# Patient Record
Sex: FEMALE | Race: OTHER | HISPANIC OR LATINO | URBAN - METROPOLITAN AREA
[De-identification: names, ages, dates, MRNs, and addresses within clinical notes are randomized per-mention and may not be internally consistent; named-entity substitution may affect disease eponyms.]

---

## 2015-11-03 RX ORDER — IBUPROFEN 200 MG
1 TABLET ORAL
Qty: 60 | Refills: 0 | COMMUNITY
Start: 2015-11-03 | End: 2015-12-03

## 2017-09-09 ENCOUNTER — EMERGENCY (EMERGENCY)
Facility: HOSPITAL | Age: 57
LOS: 1 days | Discharge: HOME | End: 2017-09-09
Attending: EMERGENCY MEDICINE | Admitting: EMERGENCY MEDICINE
Payer: MEDICAID

## 2017-09-09 VITALS
RESPIRATION RATE: 20 BRPM | SYSTOLIC BLOOD PRESSURE: 92 MMHG | DIASTOLIC BLOOD PRESSURE: 50 MMHG | OXYGEN SATURATION: 98 % | TEMPERATURE: 98 F | HEART RATE: 56 BPM

## 2017-09-09 VITALS
HEIGHT: 63 IN | RESPIRATION RATE: 20 BRPM | WEIGHT: 153 LBS | SYSTOLIC BLOOD PRESSURE: 129 MMHG | HEART RATE: 82 BPM | DIASTOLIC BLOOD PRESSURE: 77 MMHG | TEMPERATURE: 98 F | OXYGEN SATURATION: 98 %

## 2017-09-09 DIAGNOSIS — Z98.89 OTHER SPECIFIED POSTPROCEDURAL STATES: Chronic | ICD-10-CM

## 2017-09-09 DIAGNOSIS — Z87.828 PERSONAL HISTORY OF OTHER (HEALED) PHYSICAL INJURY AND TRAUMA: Chronic | ICD-10-CM

## 2017-09-09 LAB
ALBUMIN SERPL ELPH-MCNC: 4.3 G/DL — SIGNIFICANT CHANGE UP (ref 3.3–5.2)
ALP SERPL-CCNC: 67 U/L — SIGNIFICANT CHANGE UP (ref 40–120)
ALT FLD-CCNC: 8 U/L — SIGNIFICANT CHANGE UP
ANION GAP SERPL CALC-SCNC: 12 MMOL/L — SIGNIFICANT CHANGE UP (ref 5–17)
APPEARANCE UR: ABNORMAL
AST SERPL-CCNC: 9 U/L — SIGNIFICANT CHANGE UP
BASOPHILS # BLD AUTO: 0 K/UL — SIGNIFICANT CHANGE UP (ref 0–0.2)
BASOPHILS NFR BLD AUTO: 0.7 % — SIGNIFICANT CHANGE UP (ref 0–2)
BILIRUB SERPL-MCNC: 0.1 MG/DL — LOW (ref 0.4–2)
BILIRUB UR-MCNC: NEGATIVE — SIGNIFICANT CHANGE UP
BUN SERPL-MCNC: 13 MG/DL — SIGNIFICANT CHANGE UP (ref 8–20)
CALCIUM SERPL-MCNC: 9.5 MG/DL — SIGNIFICANT CHANGE UP (ref 8.6–10.2)
CHLORIDE SERPL-SCNC: 104 MMOL/L — SIGNIFICANT CHANGE UP (ref 98–107)
CO2 SERPL-SCNC: 28 MMOL/L — SIGNIFICANT CHANGE UP (ref 22–29)
COLOR SPEC: YELLOW — SIGNIFICANT CHANGE UP
CREAT SERPL-MCNC: 0.72 MG/DL — SIGNIFICANT CHANGE UP (ref 0.5–1.3)
DIFF PNL FLD: NEGATIVE — SIGNIFICANT CHANGE UP
EOSINOPHIL # BLD AUTO: 0.1 K/UL — SIGNIFICANT CHANGE UP (ref 0–0.5)
EOSINOPHIL NFR BLD AUTO: 1.7 % — SIGNIFICANT CHANGE UP (ref 0–6)
GLUCOSE SERPL-MCNC: 187 MG/DL — HIGH (ref 70–115)
GLUCOSE UR QL: 1000 MG/DL
HCT VFR BLD CALC: 38.4 % — SIGNIFICANT CHANGE UP (ref 37–47)
HGB BLD-MCNC: 12.6 G/DL — SIGNIFICANT CHANGE UP (ref 12–16)
KETONES UR-MCNC: NEGATIVE — SIGNIFICANT CHANGE UP
LEUKOCYTE ESTERASE UR-ACNC: NEGATIVE — SIGNIFICANT CHANGE UP
LYMPHOCYTES # BLD AUTO: 3 K/UL — SIGNIFICANT CHANGE UP (ref 1–4.8)
LYMPHOCYTES # BLD AUTO: 43.4 % — SIGNIFICANT CHANGE UP (ref 20–55)
MCHC RBC-ENTMCNC: 29.6 PG — SIGNIFICANT CHANGE UP (ref 27–31)
MCHC RBC-ENTMCNC: 32.8 G/DL — SIGNIFICANT CHANGE UP (ref 32–36)
MCV RBC AUTO: 90.4 FL — SIGNIFICANT CHANGE UP (ref 81–99)
MONOCYTES # BLD AUTO: 0.4 K/UL — SIGNIFICANT CHANGE UP (ref 0–0.8)
MONOCYTES NFR BLD AUTO: 5.7 % — SIGNIFICANT CHANGE UP (ref 3–10)
NEUTROPHILS # BLD AUTO: 3.3 K/UL — SIGNIFICANT CHANGE UP (ref 1.8–8)
NEUTROPHILS NFR BLD AUTO: 48.4 % — SIGNIFICANT CHANGE UP (ref 37–73)
NITRITE UR-MCNC: NEGATIVE — SIGNIFICANT CHANGE UP
PH UR: 6.5 — SIGNIFICANT CHANGE UP (ref 5–8)
PLATELET # BLD AUTO: 248 K/UL — SIGNIFICANT CHANGE UP (ref 150–400)
POTASSIUM SERPL-MCNC: 4.1 MMOL/L — SIGNIFICANT CHANGE UP (ref 3.5–5.3)
POTASSIUM SERPL-SCNC: 4.1 MMOL/L — SIGNIFICANT CHANGE UP (ref 3.5–5.3)
PROT SERPL-MCNC: 7.1 G/DL — SIGNIFICANT CHANGE UP (ref 6.6–8.7)
PROT UR-MCNC: NEGATIVE MG/DL — SIGNIFICANT CHANGE UP
RBC # BLD: 4.25 M/UL — LOW (ref 4.4–5.2)
RBC # FLD: 13.1 % — SIGNIFICANT CHANGE UP (ref 11–15.6)
SODIUM SERPL-SCNC: 144 MMOL/L — SIGNIFICANT CHANGE UP (ref 135–145)
SP GR SPEC: 1.01 — SIGNIFICANT CHANGE UP (ref 1.01–1.02)
UROBILINOGEN FLD QL: NEGATIVE MG/DL — SIGNIFICANT CHANGE UP
WBC # BLD: 6.9 K/UL — SIGNIFICANT CHANGE UP (ref 4.8–10.8)
WBC # FLD AUTO: 6.9 K/UL — SIGNIFICANT CHANGE UP (ref 4.8–10.8)

## 2017-09-09 PROCEDURE — 99284 EMERGENCY DEPT VISIT MOD MDM: CPT

## 2017-09-09 PROCEDURE — 74176 CT ABD & PELVIS W/O CONTRAST: CPT | Mod: 26

## 2017-09-09 RX ORDER — ONDANSETRON 8 MG/1
4 TABLET, FILM COATED ORAL ONCE
Qty: 0 | Refills: 0 | Status: COMPLETED | OUTPATIENT
Start: 2017-09-09 | End: 2017-09-09

## 2017-09-09 RX ORDER — KETOROLAC TROMETHAMINE 30 MG/ML
15 SYRINGE (ML) INJECTION ONCE
Qty: 0 | Refills: 0 | Status: DISCONTINUED | OUTPATIENT
Start: 2017-09-09 | End: 2017-09-09

## 2017-09-09 RX ORDER — MORPHINE SULFATE 50 MG/1
4 CAPSULE, EXTENDED RELEASE ORAL ONCE
Qty: 0 | Refills: 0 | Status: DISCONTINUED | OUTPATIENT
Start: 2017-09-09 | End: 2017-09-09

## 2017-09-09 RX ADMIN — Medication 15 MILLIGRAM(S): at 23:47

## 2017-09-09 RX ADMIN — Medication 15 MILLIGRAM(S): at 22:17

## 2017-09-09 RX ADMIN — ONDANSETRON 4 MILLIGRAM(S): 8 TABLET, FILM COATED ORAL at 22:17

## 2017-09-09 NOTE — ED STATDOCS - ATTENDING CONTRIBUTION TO CARE
I, Dr. Grijalva, performed the initial face to face bedside interview with this patient regarding history of present illness, review of symptoms and relevant past medical, social and family history.  I completed an independent physical examination.  I was the initial provider who evaluated this patient. I have signed out the follow up of any pending tests (i.e. labs, radiological studies) to the ACP.  I have communicated the patient’s plan of care and disposition with the ACP.

## 2017-09-09 NOTE — ED STATDOCS - OBJECTIVE STATEMENT
57 year old female with PMHx hysterectomy, pyonephritis, IDDM presents to ED c/o of a stabbing worsening pain on her left flanking pain starting x4 days ago. Pain is also described as a burning sensation. 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 Pt states these symptoms resemble her episode of shingles. She took x2 Advil doses with no relief. Pt claims vomiting, dysuria, chills, nausea but denies fevers. No further complaints at this time.

## 2017-09-09 NOTE — ED STATDOCS - PMH
Anemia    Anxiety    Bipolar disorder    Depression    Diabetes    High cholesterol    Hypertension    Shingles

## 2017-09-09 NOTE — ED STATDOCS - PROGRESS NOTE DETAILS
Received patient signout from Dr. Grijalva.  Patient with right flank pain pending labs and CT. PA NOTE: Pt discussed at length with attending HPI/ROS/PE confirmed. PT seen resting computably in no acute distress in chair, no acute complaints at this time, PT informed of all results, PT will be DC home with ABX, pain medication, follow up with spinal center. Educated about when to return to the ED if needed. PT verbalizes that he understands all instructions and results.

## 2017-09-09 NOTE — ED STATDOCS - MEDICAL DECISION MAKING DETAILS
Plan on obtaining CT scan, labs. Pain and nausea management. Will evaluate for possible kidney stone and UTI.

## 2017-09-10 LAB
BACTERIA # UR AUTO: ABNORMAL
EPI CELLS # UR: ABNORMAL
WBC UR QL: SIGNIFICANT CHANGE UP

## 2017-09-10 PROCEDURE — 80053 COMPREHEN METABOLIC PANEL: CPT

## 2017-09-10 PROCEDURE — 99284 EMERGENCY DEPT VISIT MOD MDM: CPT | Mod: 25

## 2017-09-10 PROCEDURE — 87086 URINE CULTURE/COLONY COUNT: CPT

## 2017-09-10 PROCEDURE — 85027 COMPLETE CBC AUTOMATED: CPT

## 2017-09-10 PROCEDURE — 81001 URINALYSIS AUTO W/SCOPE: CPT

## 2017-09-10 PROCEDURE — 96375 TX/PRO/DX INJ NEW DRUG ADDON: CPT

## 2017-09-10 PROCEDURE — 96374 THER/PROPH/DIAG INJ IV PUSH: CPT

## 2017-09-10 PROCEDURE — 36415 COLL VENOUS BLD VENIPUNCTURE: CPT

## 2017-09-10 PROCEDURE — 74176 CT ABD & PELVIS W/O CONTRAST: CPT

## 2017-09-10 PROCEDURE — 96372 THER/PROPH/DIAG INJ SC/IM: CPT | Mod: XU

## 2017-09-10 RX ORDER — DEXAMETHASONE 0.5 MG/5ML
8 ELIXIR ORAL ONCE
Qty: 0 | Refills: 0 | Status: COMPLETED | OUTPATIENT
Start: 2017-09-10 | End: 2017-09-10

## 2017-09-10 RX ORDER — NITROFURANTOIN MACROCRYSTAL 50 MG
1 CAPSULE ORAL
Qty: 14 | Refills: 0 | OUTPATIENT
Start: 2017-09-10 | End: 2017-09-17

## 2017-09-10 RX ORDER — IBUPROFEN 200 MG
1 TABLET ORAL
Qty: 12 | Refills: 0 | OUTPATIENT
Start: 2017-09-10 | End: 2017-09-14

## 2017-09-10 RX ORDER — METHOCARBAMOL 500 MG/1
1 TABLET, FILM COATED ORAL
Qty: 8 | Refills: 0 | OUTPATIENT
Start: 2017-09-10 | End: 2017-09-14

## 2017-09-10 RX ADMIN — MORPHINE SULFATE 4 MILLIGRAM(S): 50 CAPSULE, EXTENDED RELEASE ORAL at 00:07

## 2017-09-10 RX ADMIN — Medication 8 MILLIGRAM(S): at 02:29

## 2017-09-10 RX ADMIN — MORPHINE SULFATE 4 MILLIGRAM(S): 50 CAPSULE, EXTENDED RELEASE ORAL at 02:29

## 2017-09-10 NOTE — ED ADULT NURSE REASSESSMENT NOTE - STATUS
assumed care of pt, pt c/o increased pain, PA made aware, morphine ordered and given. 20g LAC present, pt pending CT results.

## 2017-09-11 LAB
CULTURE RESULTS: SIGNIFICANT CHANGE UP
SPECIMEN SOURCE: SIGNIFICANT CHANGE UP

## 2017-11-14 ENCOUNTER — EMERGENCY (EMERGENCY)
Facility: HOSPITAL | Age: 57
LOS: 1 days | Discharge: HOME | End: 2017-11-14
Attending: EMERGENCY MEDICINE | Admitting: EMERGENCY MEDICINE
Payer: MEDICAID

## 2017-11-14 VITALS
DIASTOLIC BLOOD PRESSURE: 60 MMHG | OXYGEN SATURATION: 98 % | WEIGHT: 179.9 LBS | HEIGHT: 65 IN | RESPIRATION RATE: 20 BRPM | SYSTOLIC BLOOD PRESSURE: 90 MMHG | HEART RATE: 66 BPM | TEMPERATURE: 98 F

## 2017-11-14 VITALS
HEART RATE: 71 BPM | TEMPERATURE: 99 F | RESPIRATION RATE: 16 BRPM | SYSTOLIC BLOOD PRESSURE: 123 MMHG | OXYGEN SATURATION: 99 % | DIASTOLIC BLOOD PRESSURE: 84 MMHG

## 2017-11-14 DIAGNOSIS — Z98.89 OTHER SPECIFIED POSTPROCEDURAL STATES: Chronic | ICD-10-CM

## 2017-11-14 DIAGNOSIS — Z87.828 PERSONAL HISTORY OF OTHER (HEALED) PHYSICAL INJURY AND TRAUMA: Chronic | ICD-10-CM

## 2017-11-14 LAB
ALBUMIN SERPL ELPH-MCNC: 4.5 G/DL — SIGNIFICANT CHANGE UP (ref 3.3–5.2)
ALP SERPL-CCNC: 50 U/L — SIGNIFICANT CHANGE UP (ref 40–120)
ALT FLD-CCNC: 9 U/L — SIGNIFICANT CHANGE UP
ANION GAP SERPL CALC-SCNC: 12 MMOL/L — SIGNIFICANT CHANGE UP (ref 5–17)
APPEARANCE UR: CLEAR — SIGNIFICANT CHANGE UP
AST SERPL-CCNC: 12 U/L — SIGNIFICANT CHANGE UP
BILIRUB SERPL-MCNC: 0.2 MG/DL — LOW (ref 0.4–2)
BILIRUB UR-MCNC: NEGATIVE — SIGNIFICANT CHANGE UP
BUN SERPL-MCNC: 12 MG/DL — SIGNIFICANT CHANGE UP (ref 8–20)
CALCIUM SERPL-MCNC: 9.8 MG/DL — SIGNIFICANT CHANGE UP (ref 8.6–10.2)
CHLORIDE SERPL-SCNC: 101 MMOL/L — SIGNIFICANT CHANGE UP (ref 98–107)
CO2 SERPL-SCNC: 28 MMOL/L — SIGNIFICANT CHANGE UP (ref 22–29)
COLOR SPEC: SIGNIFICANT CHANGE UP
CREAT SERPL-MCNC: 0.98 MG/DL — SIGNIFICANT CHANGE UP (ref 0.5–1.3)
DIFF PNL FLD: NEGATIVE — SIGNIFICANT CHANGE UP
GLUCOSE SERPL-MCNC: 136 MG/DL — HIGH (ref 70–115)
GLUCOSE UR QL: NEGATIVE MG/DL — SIGNIFICANT CHANGE UP
HCT VFR BLD CALC: 39.4 % — SIGNIFICANT CHANGE UP (ref 37–47)
HGB BLD-MCNC: 13.4 G/DL — SIGNIFICANT CHANGE UP (ref 12–16)
KETONES UR-MCNC: NEGATIVE — SIGNIFICANT CHANGE UP
LEUKOCYTE ESTERASE UR-ACNC: NEGATIVE — SIGNIFICANT CHANGE UP
MCHC RBC-ENTMCNC: 29.5 PG — SIGNIFICANT CHANGE UP (ref 27–31)
MCHC RBC-ENTMCNC: 34 G/DL — SIGNIFICANT CHANGE UP (ref 32–36)
MCV RBC AUTO: 86.8 FL — SIGNIFICANT CHANGE UP (ref 81–99)
NITRITE UR-MCNC: NEGATIVE — SIGNIFICANT CHANGE UP
PH UR: 6.5 — SIGNIFICANT CHANGE UP (ref 5–8)
PLATELET # BLD AUTO: 249 K/UL — SIGNIFICANT CHANGE UP (ref 150–400)
POTASSIUM SERPL-MCNC: 4.5 MMOL/L — SIGNIFICANT CHANGE UP (ref 3.5–5.3)
POTASSIUM SERPL-SCNC: 4.5 MMOL/L — SIGNIFICANT CHANGE UP (ref 3.5–5.3)
PROT SERPL-MCNC: 7.3 G/DL — SIGNIFICANT CHANGE UP (ref 6.6–8.7)
PROT UR-MCNC: NEGATIVE MG/DL — SIGNIFICANT CHANGE UP
RBC # BLD: 4.54 M/UL — SIGNIFICANT CHANGE UP (ref 4.4–5.2)
RBC # FLD: 12.6 % — SIGNIFICANT CHANGE UP (ref 11–15.6)
SODIUM SERPL-SCNC: 141 MMOL/L — SIGNIFICANT CHANGE UP (ref 135–145)
SP GR SPEC: 1 — LOW (ref 1.01–1.02)
UROBILINOGEN FLD QL: NEGATIVE MG/DL — SIGNIFICANT CHANGE UP
WBC # BLD: 7.6 K/UL — SIGNIFICANT CHANGE UP (ref 4.8–10.8)
WBC # FLD AUTO: 7.6 K/UL — SIGNIFICANT CHANGE UP (ref 4.8–10.8)

## 2017-11-14 PROCEDURE — 99284 EMERGENCY DEPT VISIT MOD MDM: CPT | Mod: 25

## 2017-11-14 PROCEDURE — 76770 US EXAM ABDO BACK WALL COMP: CPT

## 2017-11-14 PROCEDURE — 96375 TX/PRO/DX INJ NEW DRUG ADDON: CPT

## 2017-11-14 PROCEDURE — 96374 THER/PROPH/DIAG INJ IV PUSH: CPT

## 2017-11-14 PROCEDURE — 81003 URINALYSIS AUTO W/O SCOPE: CPT

## 2017-11-14 PROCEDURE — 76775 US EXAM ABDO BACK WALL LIM: CPT | Mod: 26

## 2017-11-14 PROCEDURE — 99285 EMERGENCY DEPT VISIT HI MDM: CPT

## 2017-11-14 PROCEDURE — 76770 US EXAM ABDO BACK WALL COMP: CPT | Mod: 26

## 2017-11-14 PROCEDURE — 87086 URINE CULTURE/COLONY COUNT: CPT

## 2017-11-14 PROCEDURE — 80053 COMPREHEN METABOLIC PANEL: CPT

## 2017-11-14 PROCEDURE — 76775 US EXAM ABDO BACK WALL LIM: CPT

## 2017-11-14 PROCEDURE — 36415 COLL VENOUS BLD VENIPUNCTURE: CPT

## 2017-11-14 PROCEDURE — 85027 COMPLETE CBC AUTOMATED: CPT

## 2017-11-14 RX ORDER — SODIUM CHLORIDE 9 MG/ML
1000 INJECTION INTRAMUSCULAR; INTRAVENOUS; SUBCUTANEOUS ONCE
Qty: 0 | Refills: 0 | Status: COMPLETED | OUTPATIENT
Start: 2017-11-14 | End: 2017-11-14

## 2017-11-14 RX ORDER — OXYCODONE AND ACETAMINOPHEN 5; 325 MG/1; MG/1
1 TABLET ORAL ONCE
Qty: 0 | Refills: 0 | Status: DISCONTINUED | OUTPATIENT
Start: 2017-11-14 | End: 2017-11-14

## 2017-11-14 RX ORDER — MORPHINE SULFATE 50 MG/1
2 CAPSULE, EXTENDED RELEASE ORAL ONCE
Qty: 0 | Refills: 0 | Status: DISCONTINUED | OUTPATIENT
Start: 2017-11-14 | End: 2017-11-14

## 2017-11-14 RX ORDER — KETOROLAC TROMETHAMINE 30 MG/ML
30 SYRINGE (ML) INJECTION ONCE
Qty: 0 | Refills: 0 | Status: DISCONTINUED | OUTPATIENT
Start: 2017-11-14 | End: 2017-11-14

## 2017-11-14 RX ADMIN — SODIUM CHLORIDE 2000 MILLILITER(S): 9 INJECTION INTRAMUSCULAR; INTRAVENOUS; SUBCUTANEOUS at 17:40

## 2017-11-14 RX ADMIN — OXYCODONE AND ACETAMINOPHEN 1 TABLET(S): 5; 325 TABLET ORAL at 19:47

## 2017-11-14 RX ADMIN — MORPHINE SULFATE 2 MILLIGRAM(S): 50 CAPSULE, EXTENDED RELEASE ORAL at 17:39

## 2017-11-14 RX ADMIN — Medication 30 MILLIGRAM(S): at 17:39

## 2017-11-14 NOTE — ED STATDOCS - ATTENDING CONTRIBUTION TO CARE
I, Jonathon Yen, performed the initial face to face bedside interview with this patient regarding history of present illness, review of symptoms and relevant past medical, social and family history.  I completed an independent physical examination.  I was the initial provider who evaluated this patient. I have signed out the follow up of any pending tests (i.e. labs, radiological studies) to the ACP.  I have communicated the patient’s plan of care and disposition with the ACP.

## 2017-11-14 NOTE — ED ADULT NURSE NOTE - OBJECTIVE STATEMENT
pt presents to ED with left flank pain since yesterday with dysuria. denies hematuria. afebrile. denies n/v pt states she has hx of sciatica. pt ambulates with pain. a&ox3. will continue to monitor and reassess

## 2017-11-14 NOTE — ED STATDOCS - OBJECTIVE STATEMENT
56 yo F presents to ED c/o right sided back pain radiating down to RLE similar to sciatica pain. Pt reports difficulty walking secondary to pain. She also reports new onset of left sided abd pain radiating to left flank. Pt has had multiple evaluations including imaging for similar pain. Minimal relief with Motrin.   Associated hot flashes and headaches. Denies fevers, dysuria. PMHx includes diabetes, asthma, anxiety. PSHx includes partial hysterectomy. Denies tobacco and alcohol.
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
## 2017-11-14 NOTE — ED STATDOCS - CARE PLAN
Principal Discharge DX:	Sciatic leg pain  Instructions for follow-up, activity and diet:	Continue with OTC pain medication as discussed Principal Discharge DX:	Sciatic leg pain  Instructions for follow-up, activity and diet:	Continue with OTC pain medication as discussed  Secondary Diagnosis:	Flank pain, acute

## 2017-11-15 LAB
CULTURE RESULTS: SIGNIFICANT CHANGE UP
SPECIMEN SOURCE: SIGNIFICANT CHANGE UP

## 2017-11-24 ENCOUNTER — EMERGENCY (EMERGENCY)
Facility: HOSPITAL | Age: 57
LOS: 1 days | Discharge: HOME | End: 2017-11-24
Attending: EMERGENCY MEDICINE
Payer: MEDICAID

## 2017-11-24 VITALS
HEIGHT: 63 IN | HEART RATE: 83 BPM | WEIGHT: 151.02 LBS | RESPIRATION RATE: 20 BRPM | OXYGEN SATURATION: 99 % | TEMPERATURE: 98 F | SYSTOLIC BLOOD PRESSURE: 119 MMHG | DIASTOLIC BLOOD PRESSURE: 85 MMHG

## 2017-11-24 DIAGNOSIS — Z98.89 OTHER SPECIFIED POSTPROCEDURAL STATES: Chronic | ICD-10-CM

## 2017-11-24 DIAGNOSIS — Z87.828 PERSONAL HISTORY OF OTHER (HEALED) PHYSICAL INJURY AND TRAUMA: Chronic | ICD-10-CM

## 2017-11-24 PROCEDURE — 96372 THER/PROPH/DIAG INJ SC/IM: CPT

## 2017-11-24 PROCEDURE — 99283 EMERGENCY DEPT VISIT LOW MDM: CPT | Mod: 25

## 2017-11-24 PROCEDURE — 99284 EMERGENCY DEPT VISIT MOD MDM: CPT

## 2017-11-24 RX ORDER — DEXAMETHASONE 0.5 MG/5ML
10 ELIXIR ORAL ONCE
Qty: 0 | Refills: 0 | Status: COMPLETED | OUTPATIENT
Start: 2017-11-24 | End: 2017-11-24

## 2017-11-24 RX ORDER — KETOROLAC TROMETHAMINE 30 MG/ML
30 SYRINGE (ML) INJECTION ONCE
Qty: 0 | Refills: 0 | Status: DISCONTINUED | OUTPATIENT
Start: 2017-11-24 | End: 2017-11-24

## 2017-11-24 RX ORDER — ACETAMINOPHEN 500 MG
650 TABLET ORAL ONCE
Qty: 0 | Refills: 0 | Status: COMPLETED | OUTPATIENT
Start: 2017-11-24 | End: 2017-11-24

## 2017-11-24 RX ORDER — METHOCARBAMOL 500 MG/1
1 TABLET, FILM COATED ORAL
Qty: 14 | Refills: 0 | OUTPATIENT
Start: 2017-11-24 | End: 2017-12-01

## 2017-11-24 RX ORDER — LIDOCAINE 4 G/100G
1 CREAM TOPICAL ONCE
Qty: 0 | Refills: 0 | Status: COMPLETED | OUTPATIENT
Start: 2017-11-24 | End: 2017-11-24

## 2017-11-24 RX ORDER — DIAZEPAM 5 MG
2 TABLET ORAL ONCE
Qty: 0 | Refills: 0 | Status: DISCONTINUED | OUTPATIENT
Start: 2017-11-24 | End: 2017-11-24

## 2017-11-24 RX ADMIN — Medication 10 MILLIGRAM(S): at 14:25

## 2017-11-24 RX ADMIN — Medication 650 MILLIGRAM(S): at 14:26

## 2017-11-24 RX ADMIN — Medication 650 MILLIGRAM(S): at 14:25

## 2017-11-24 RX ADMIN — Medication 2 MILLIGRAM(S): at 12:37

## 2017-11-24 RX ADMIN — LIDOCAINE 1 PATCH: 4 CREAM TOPICAL at 14:36

## 2017-11-24 RX ADMIN — Medication 30 MILLIGRAM(S): at 12:37

## 2017-11-24 RX ADMIN — Medication 30 MILLIGRAM(S): at 12:39

## 2017-11-24 NOTE — ED STATDOCS - MUSCULOSKELETAL, MLM
R medial scapular muscles spasms extending up to R cervical.  L medial scapular muscle spasms. Decreased bilat side bend. Limited rotation to the R.

## 2017-11-24 NOTE — ED STATDOCS - ATTENDING CONTRIBUTION TO CARE
I, Shikha Lizarraga, performed the initial face to face bedside interview with this patient regarding history of present illness, review of symptoms and relevant past medical, social and family history.  I completed an independent physical examination.  I was the initial provider who evaluated this patient. I have signed out the follow up of any pending tests (i.e. labs, radiological studies) to the ACP.  I have communicated the patient’s plan of care and disposition with the ACP.  The history, relevant review of systems, past medical and surgical history, medical decision making, and physical examination was documented by the scribe in my presence and I attest to the accuracy of the documentation.

## 2017-11-24 NOTE — ED STATDOCS - OBJECTIVE STATEMENT
This is a 56 y/o F presents to ED c/o stiff neck and R arm pain. Pt reports a few days ago awoke with HA, went to sleep awoke with neck pain and now pain is radiating to R arm and feels tingling to fingers. Pt tried using hot compress, hold compress and Motrin to no relief. Denies N/V/D, fever, chills, SOB, CP, difficulty breathing and abd pain.

## 2017-11-24 NOTE — ED STATDOCS - PROGRESS NOTE DETAILS
PA NOTE: Pt seen by intake physician and hpi/orders/plan reviewed. Will follow up plan as per intake physician. PT reports improving pain s/p medication. Will d/c home with spine follow up

## 2017-11-24 NOTE — ED ADULT NURSE NOTE - OBJECTIVE STATEMENT
pt arrived with right arm and neck pain, pt states she woke up with severe pain 9/10 and stiffness radiating down arm with tingling, pt states pain has gotten worse, denies any trauma

## 2018-01-26 ENCOUNTER — EMERGENCY (EMERGENCY)
Facility: HOSPITAL | Age: 58
LOS: 1 days | Discharge: HOME | End: 2018-01-26
Attending: EMERGENCY MEDICINE
Payer: MEDICAID

## 2018-01-26 VITALS
DIASTOLIC BLOOD PRESSURE: 65 MMHG | HEART RATE: 78 BPM | TEMPERATURE: 98 F | OXYGEN SATURATION: 97 % | RESPIRATION RATE: 20 BRPM | WEIGHT: 141.98 LBS | HEIGHT: 62 IN | SYSTOLIC BLOOD PRESSURE: 95 MMHG

## 2018-01-26 DIAGNOSIS — Z87.828 PERSONAL HISTORY OF OTHER (HEALED) PHYSICAL INJURY AND TRAUMA: Chronic | ICD-10-CM

## 2018-01-26 DIAGNOSIS — Z98.89 OTHER SPECIFIED POSTPROCEDURAL STATES: Chronic | ICD-10-CM

## 2018-01-26 PROCEDURE — 99283 EMERGENCY DEPT VISIT LOW MDM: CPT

## 2018-01-26 RX ORDER — IBUPROFEN 200 MG
600 TABLET ORAL ONCE
Qty: 0 | Refills: 0 | Status: DISCONTINUED | OUTPATIENT
Start: 2018-01-26 | End: 2018-01-26

## 2018-01-26 RX ORDER — AZTREONAM 2 G
1 VIAL (EA) INJECTION
Qty: 20 | Refills: 0 | OUTPATIENT
Start: 2018-01-26 | End: 2018-02-04

## 2018-01-26 RX ORDER — ACETAMINOPHEN 500 MG
975 TABLET ORAL ONCE
Qty: 0 | Refills: 0 | Status: COMPLETED | OUTPATIENT
Start: 2018-01-26 | End: 2018-01-26

## 2018-01-26 RX ADMIN — Medication 1 TABLET(S): at 17:51

## 2018-01-26 RX ADMIN — Medication 975 MILLIGRAM(S): at 17:57

## 2018-01-26 NOTE — ED PROVIDER NOTE - PHYSICAL EXAMINATION
Positive red circular red lesion to right breast with central punctate, no mass, no lump, no current discharge, no induration, no fluctuance

## 2018-01-26 NOTE — ED ADULT NURSE NOTE - CAS ELECT INFOMATION PROVIDED
Physical Exam  Mallampati: II  TM Distance: >3 FB  Neck ROM: Full  cardiovascular exam normal  pulmonary exam normal  abdominal exam normal      Legend: C=Chipped  M=Missing  L=Loose    Anesthesia Plan  ASA Status: 2  Anesthesia Type: MAC  Induction: Intravenous  Maintenance: TIVA  Reviewed: Problem List, Allergies, Patient Summary, Lab Results, Past Med History, Medications, NPO Status and Pre-Induction Reassessment  The proposed anesthetic plan, including its risks and benefits, have been discussed with the Patient - along with the risks and benefits of alternatives.  Questions were encouraged and answered and the patient and/or representative agrees to proceed.  Blood Products: Not Anticipated  Plan Comments: Monitored anesthesia care, risks/benefits explained to patient. Questions/concerns addressed. Consent obtained to proceed as discussed.      Anesthesia ROS/Med Hx    Overall Review:  Pts. EKG was reviewed   Pt. has no history of anesthetic complications  Pt. does not have difficult airway    Pulmonary Review:    Negative for pulmonary    Neuro/Psych Review:    Negative for all neuro/psych ROS    Cardiovascular Review:    Pt. positive for hypertension  Pt. positive for hyperlipidemia    GI/HEPATIC/RENAL Review:    Pt. negative for GI/Hepatic/Renal ROS    End/Other Review:    Pt. positive for substance abuse (Chronic alcohol)    
DC instructions

## 2018-01-26 NOTE — ED ADULT TRIAGE NOTE - NS ED NURSE BANDS TYPE
tsh still elevated. ft4 improved. If has been taking med regularly, inc to 125mcg po daily. Rpt thyroid labs in 2 mo. Reflex         Plan:                  I, Christiano Saini MA, am scribing for and in the presence of Eric Litten, MD. Electronically signed by Christiano Saini MA on 1/25/2018 at 6:55 PM    I, Eric Litten, MD  personally performed the services described in this documentation, as scribed by the user listed above in my presence, and it is both accurate and complete. I agree with the Chief Complaint, ROS, and Past Histories independently gathered by the clinical support staff and the remaining scribed note accurately describes my personal service to the patient.     1/25/2018    7:43 PM Name band;

## 2018-01-26 NOTE — ED PROVIDER NOTE - OBJECTIVE STATEMENT
Patient presents c/o abscess to right breast, patient states abscess opened up yesterday with green discharge.  no fevers, no chills, patient has had similar in past

## 2018-01-26 NOTE — ED ADULT NURSE NOTE - OBJECTIVE STATEMENT
pt arrived with abscess to right breast, pt also states started to get one to the left breast pt states very painful 9/10 pt also states yellow discharge from right side

## 2018-01-26 NOTE — ED PROVIDER NOTE - ATTENDING CONTRIBUTION TO CARE
I, Tanna Silva, performed the initial face to face bedside interview with this patient regarding history of present illness, review of symptoms and relevant past medical, social and family history.  I completed an independent physical examination.  I was the initial provider who evaluated this patient. I have signed out the follow up of any pending tests (i.e. labs, radiological studies) to the ACP.  I have communicated the patient’s plan of care and disposition with the ACP.

## 2018-04-01 ENCOUNTER — OUTPATIENT (OUTPATIENT)
Dept: OUTPATIENT SERVICES | Facility: HOSPITAL | Age: 58
LOS: 1 days | End: 2018-04-01
Payer: MEDICAID

## 2018-04-01 DIAGNOSIS — Z98.89 OTHER SPECIFIED POSTPROCEDURAL STATES: Chronic | ICD-10-CM

## 2018-04-01 DIAGNOSIS — Z87.828 PERSONAL HISTORY OF OTHER (HEALED) PHYSICAL INJURY AND TRAUMA: Chronic | ICD-10-CM

## 2018-04-01 PROCEDURE — G9001: CPT

## 2018-04-18 DIAGNOSIS — R69 ILLNESS, UNSPECIFIED: ICD-10-CM

## 2018-04-22 ENCOUNTER — EMERGENCY (EMERGENCY)
Facility: HOSPITAL | Age: 58
LOS: 1 days | Discharge: HOME | End: 2018-04-22
Attending: EMERGENCY MEDICINE
Payer: MEDICAID

## 2018-04-22 VITALS — HEIGHT: 63 IN | WEIGHT: 145.95 LBS

## 2018-04-22 VITALS
SYSTOLIC BLOOD PRESSURE: 100 MMHG | HEART RATE: 98 BPM | RESPIRATION RATE: 18 BRPM | TEMPERATURE: 98 F | OXYGEN SATURATION: 96 % | DIASTOLIC BLOOD PRESSURE: 65 MMHG

## 2018-04-22 DIAGNOSIS — Z98.89 OTHER SPECIFIED POSTPROCEDURAL STATES: Chronic | ICD-10-CM

## 2018-04-22 DIAGNOSIS — Z87.828 PERSONAL HISTORY OF OTHER (HEALED) PHYSICAL INJURY AND TRAUMA: Chronic | ICD-10-CM

## 2018-04-22 LAB
ALBUMIN SERPL ELPH-MCNC: 4.3 G/DL — SIGNIFICANT CHANGE UP (ref 3.3–5.2)
ALP SERPL-CCNC: 52 U/L — SIGNIFICANT CHANGE UP (ref 40–120)
ALT FLD-CCNC: 12 U/L — SIGNIFICANT CHANGE UP
ANION GAP SERPL CALC-SCNC: 13 MMOL/L — SIGNIFICANT CHANGE UP (ref 5–17)
APPEARANCE UR: CLEAR — SIGNIFICANT CHANGE UP
APTT BLD: 30.1 SEC — SIGNIFICANT CHANGE UP (ref 27.5–37.4)
AST SERPL-CCNC: 13 U/L — SIGNIFICANT CHANGE UP
BASOPHILS # BLD AUTO: 0 K/UL — SIGNIFICANT CHANGE UP (ref 0–0.2)
BASOPHILS NFR BLD AUTO: 0.2 % — SIGNIFICANT CHANGE UP (ref 0–2)
BILIRUB SERPL-MCNC: <0.2 MG/DL — LOW (ref 0.4–2)
BILIRUB UR-MCNC: NEGATIVE — SIGNIFICANT CHANGE UP
BLD GP AB SCN SERPL QL: SIGNIFICANT CHANGE UP
BUN SERPL-MCNC: 17 MG/DL — SIGNIFICANT CHANGE UP (ref 8–20)
CALCIUM SERPL-MCNC: 9.2 MG/DL — SIGNIFICANT CHANGE UP (ref 8.6–10.2)
CHLORIDE SERPL-SCNC: 99 MMOL/L — SIGNIFICANT CHANGE UP (ref 98–107)
CO2 SERPL-SCNC: 25 MMOL/L — SIGNIFICANT CHANGE UP (ref 22–29)
COLOR SPEC: YELLOW — SIGNIFICANT CHANGE UP
CREAT SERPL-MCNC: 0.7 MG/DL — SIGNIFICANT CHANGE UP (ref 0.5–1.3)
DIFF PNL FLD: ABNORMAL
EOSINOPHIL # BLD AUTO: 0.2 K/UL — SIGNIFICANT CHANGE UP (ref 0–0.5)
EOSINOPHIL NFR BLD AUTO: 1.7 % — SIGNIFICANT CHANGE UP (ref 0–6)
EPI CELLS # UR: SIGNIFICANT CHANGE UP
GLUCOSE SERPL-MCNC: 403 MG/DL — HIGH (ref 70–115)
GLUCOSE UR QL: 1000 MG/DL
HCT VFR BLD CALC: 38.4 % — SIGNIFICANT CHANGE UP (ref 37–47)
HGB BLD-MCNC: 12.8 G/DL — SIGNIFICANT CHANGE UP (ref 12–16)
INR BLD: 0.92 RATIO — SIGNIFICANT CHANGE UP (ref 0.88–1.16)
KETONES UR-MCNC: NEGATIVE — SIGNIFICANT CHANGE UP
LEUKOCYTE ESTERASE UR-ACNC: ABNORMAL
LIDOCAIN IGE QN: 39 U/L — SIGNIFICANT CHANGE UP (ref 22–51)
LYMPHOCYTES # BLD AUTO: 1.5 K/UL — SIGNIFICANT CHANGE UP (ref 1–4.8)
LYMPHOCYTES # BLD AUTO: 17.4 % — LOW (ref 20–55)
MCHC RBC-ENTMCNC: 29.8 PG — SIGNIFICANT CHANGE UP (ref 27–31)
MCHC RBC-ENTMCNC: 33.3 G/DL — SIGNIFICANT CHANGE UP (ref 32–36)
MCV RBC AUTO: 89.3 FL — SIGNIFICANT CHANGE UP (ref 81–99)
MONOCYTES # BLD AUTO: 0.7 K/UL — SIGNIFICANT CHANGE UP (ref 0–0.8)
MONOCYTES NFR BLD AUTO: 8.2 % — SIGNIFICANT CHANGE UP (ref 3–10)
NEUTROPHILS # BLD AUTO: 6.3 K/UL — SIGNIFICANT CHANGE UP (ref 1.8–8)
NEUTROPHILS NFR BLD AUTO: 72.3 % — SIGNIFICANT CHANGE UP (ref 37–73)
NITRITE UR-MCNC: NEGATIVE — SIGNIFICANT CHANGE UP
PH UR: 6 — SIGNIFICANT CHANGE UP (ref 5–8)
PLATELET # BLD AUTO: 223 K/UL — SIGNIFICANT CHANGE UP (ref 150–400)
POTASSIUM SERPL-MCNC: 4.5 MMOL/L — SIGNIFICANT CHANGE UP (ref 3.5–5.3)
POTASSIUM SERPL-SCNC: 4.5 MMOL/L — SIGNIFICANT CHANGE UP (ref 3.5–5.3)
PROT SERPL-MCNC: 6.9 G/DL — SIGNIFICANT CHANGE UP (ref 6.6–8.7)
PROT UR-MCNC: 15 MG/DL
PROTHROM AB SERPL-ACNC: 10.1 SEC — SIGNIFICANT CHANGE UP (ref 9.8–12.7)
RBC # BLD: 4.3 M/UL — LOW (ref 4.4–5.2)
RBC # FLD: 13.4 % — SIGNIFICANT CHANGE UP (ref 11–15.6)
RBC CASTS # UR COMP ASSIST: SIGNIFICANT CHANGE UP /HPF (ref 0–4)
SODIUM SERPL-SCNC: 137 MMOL/L — SIGNIFICANT CHANGE UP (ref 135–145)
SP GR SPEC: 1.01 — SIGNIFICANT CHANGE UP (ref 1.01–1.02)
UROBILINOGEN FLD QL: NEGATIVE MG/DL — SIGNIFICANT CHANGE UP
WBC # BLD: 8.7 K/UL — SIGNIFICANT CHANGE UP (ref 4.8–10.8)
WBC # FLD AUTO: 8.7 K/UL — SIGNIFICANT CHANGE UP (ref 4.8–10.8)
WBC UR QL: ABNORMAL

## 2018-04-22 PROCEDURE — 86850 RBC ANTIBODY SCREEN: CPT

## 2018-04-22 PROCEDURE — 74176 CT ABD & PELVIS W/O CONTRAST: CPT | Mod: 26

## 2018-04-22 PROCEDURE — 99284 EMERGENCY DEPT VISIT MOD MDM: CPT

## 2018-04-22 PROCEDURE — 86900 BLOOD TYPING SEROLOGIC ABO: CPT

## 2018-04-22 PROCEDURE — 36415 COLL VENOUS BLD VENIPUNCTURE: CPT

## 2018-04-22 PROCEDURE — 85730 THROMBOPLASTIN TIME PARTIAL: CPT

## 2018-04-22 PROCEDURE — 85027 COMPLETE CBC AUTOMATED: CPT

## 2018-04-22 PROCEDURE — 99284 EMERGENCY DEPT VISIT MOD MDM: CPT | Mod: 25

## 2018-04-22 PROCEDURE — 87186 SC STD MICRODIL/AGAR DIL: CPT

## 2018-04-22 PROCEDURE — 74176 CT ABD & PELVIS W/O CONTRAST: CPT

## 2018-04-22 PROCEDURE — 87086 URINE CULTURE/COLONY COUNT: CPT

## 2018-04-22 PROCEDURE — 81001 URINALYSIS AUTO W/SCOPE: CPT

## 2018-04-22 PROCEDURE — 96374 THER/PROPH/DIAG INJ IV PUSH: CPT

## 2018-04-22 PROCEDURE — 86901 BLOOD TYPING SEROLOGIC RH(D): CPT

## 2018-04-22 PROCEDURE — 85610 PROTHROMBIN TIME: CPT

## 2018-04-22 PROCEDURE — 83690 ASSAY OF LIPASE: CPT

## 2018-04-22 PROCEDURE — 80053 COMPREHEN METABOLIC PANEL: CPT

## 2018-04-22 RX ORDER — SODIUM CHLORIDE 9 MG/ML
1000 INJECTION INTRAMUSCULAR; INTRAVENOUS; SUBCUTANEOUS ONCE
Qty: 0 | Refills: 0 | Status: COMPLETED | OUTPATIENT
Start: 2018-04-22 | End: 2018-04-22

## 2018-04-22 RX ORDER — KETOROLAC TROMETHAMINE 30 MG/ML
15 SYRINGE (ML) INJECTION ONCE
Qty: 0 | Refills: 0 | Status: DISCONTINUED | OUTPATIENT
Start: 2018-04-22 | End: 2018-04-22

## 2018-04-22 RX ORDER — MORPHINE SULFATE 50 MG/1
2 CAPSULE, EXTENDED RELEASE ORAL ONCE
Qty: 0 | Refills: 0 | Status: DISCONTINUED | OUTPATIENT
Start: 2018-04-22 | End: 2018-04-22

## 2018-04-22 RX ORDER — CEPHALEXIN 500 MG
1 CAPSULE ORAL
Qty: 28 | Refills: 0 | OUTPATIENT
Start: 2018-04-22 | End: 2018-04-28

## 2018-04-22 RX ADMIN — Medication 15 MILLIGRAM(S): at 21:35

## 2018-04-22 RX ADMIN — SODIUM CHLORIDE 1000 MILLILITER(S): 9 INJECTION INTRAMUSCULAR; INTRAVENOUS; SUBCUTANEOUS at 21:03

## 2018-04-22 NOTE — ED PROVIDER NOTE - PHYSICAL EXAMINATION
General: Well appearing, sitting comfortably Eyes: PERRL, conjunctiva pink, sclera white bilaterally Cardio: S1/S2, no murmurs Resp: Clear to auscultation bilaterally, no wheezes, rales or rhonchi Abd: Soft, mild RLQ pain on palpation, nondistended : Right CVA tenderness Skin: Warm, dry without rashes

## 2018-04-22 NOTE — ED PROVIDER NOTE - PROGRESS NOTE DETAILS
Pt reports improvement in pain, Will treat patient with antibiotics, keflex to patients pharamacy, copy of results printed for patient, GI referral, pt explained d/c instructions, pt verbalizes understanding d/c instructions

## 2018-04-22 NOTE — ED PROVIDER NOTE - OBJECTIVE STATEMENT
Patient is a 56 y/o female with pmhx of appendectomy, splenectomy, partial hysterectomy c/o of right sided flank pain. Patient states today she started having right flank pain, with sudden onset and describes pain as sharp. Patient states pain is radiating to right lower abdominal pain. Patient admits to nausea and dysuria that started today. Patient denies fevers, chest pain, SOB, vomiting, diarrhea, constipation.

## 2018-04-23 VITALS
SYSTOLIC BLOOD PRESSURE: 119 MMHG | HEART RATE: 101 BPM | HEIGHT: 63 IN | RESPIRATION RATE: 20 BRPM | DIASTOLIC BLOOD PRESSURE: 72 MMHG | TEMPERATURE: 103 F | WEIGHT: 162.04 LBS | OXYGEN SATURATION: 94 %

## 2018-04-23 LAB
ALBUMIN SERPL ELPH-MCNC: 4.4 G/DL — SIGNIFICANT CHANGE UP (ref 3.3–5.2)
ALP SERPL-CCNC: 50 U/L — SIGNIFICANT CHANGE UP (ref 40–120)
ALT FLD-CCNC: 15 U/L — SIGNIFICANT CHANGE UP
ANION GAP SERPL CALC-SCNC: 15 MMOL/L — SIGNIFICANT CHANGE UP (ref 5–17)
APPEARANCE UR: CLEAR — SIGNIFICANT CHANGE UP
AST SERPL-CCNC: 14 U/L — SIGNIFICANT CHANGE UP
BASOPHILS # BLD AUTO: 0 K/UL — SIGNIFICANT CHANGE UP (ref 0–0.2)
BASOPHILS NFR BLD AUTO: 0.1 % — SIGNIFICANT CHANGE UP (ref 0–2)
BILIRUB SERPL-MCNC: 0.3 MG/DL — LOW (ref 0.4–2)
BILIRUB UR-MCNC: NEGATIVE — SIGNIFICANT CHANGE UP
BUN SERPL-MCNC: 8 MG/DL — SIGNIFICANT CHANGE UP (ref 8–20)
CALCIUM SERPL-MCNC: 9.1 MG/DL — SIGNIFICANT CHANGE UP (ref 8.6–10.2)
CHLORIDE SERPL-SCNC: 94 MMOL/L — LOW (ref 98–107)
CO2 SERPL-SCNC: 25 MMOL/L — SIGNIFICANT CHANGE UP (ref 22–29)
COLOR SPEC: YELLOW — SIGNIFICANT CHANGE UP
CREAT SERPL-MCNC: 0.52 MG/DL — SIGNIFICANT CHANGE UP (ref 0.5–1.3)
DIFF PNL FLD: ABNORMAL
EOSINOPHIL # BLD AUTO: 0.1 K/UL — SIGNIFICANT CHANGE UP (ref 0–0.5)
EOSINOPHIL NFR BLD AUTO: 0.5 % — SIGNIFICANT CHANGE UP (ref 0–6)
EPI CELLS # UR: SIGNIFICANT CHANGE UP
GLUCOSE SERPL-MCNC: 233 MG/DL — HIGH (ref 70–115)
GLUCOSE UR QL: 250 MG/DL
HCT VFR BLD CALC: 37.9 % — SIGNIFICANT CHANGE UP (ref 37–47)
HGB BLD-MCNC: 12.7 G/DL — SIGNIFICANT CHANGE UP (ref 12–16)
KETONES UR-MCNC: NEGATIVE — SIGNIFICANT CHANGE UP
LACTATE BLDV-MCNC: 0.8 MMOL/L — SIGNIFICANT CHANGE UP (ref 0.5–2)
LEUKOCYTE ESTERASE UR-ACNC: ABNORMAL
LYMPHOCYTES # BLD AUTO: 1.1 K/UL — SIGNIFICANT CHANGE UP (ref 1–4.8)
LYMPHOCYTES # BLD AUTO: 9.8 % — LOW (ref 20–55)
MCHC RBC-ENTMCNC: 29.4 PG — SIGNIFICANT CHANGE UP (ref 27–31)
MCHC RBC-ENTMCNC: 33.5 G/DL — SIGNIFICANT CHANGE UP (ref 32–36)
MCV RBC AUTO: 87.7 FL — SIGNIFICANT CHANGE UP (ref 81–99)
MONOCYTES # BLD AUTO: 0.9 K/UL — HIGH (ref 0–0.8)
MONOCYTES NFR BLD AUTO: 8 % — SIGNIFICANT CHANGE UP (ref 3–10)
NEUTROPHILS # BLD AUTO: 9.2 K/UL — HIGH (ref 1.8–8)
NEUTROPHILS NFR BLD AUTO: 81.3 % — HIGH (ref 37–73)
NITRITE UR-MCNC: NEGATIVE — SIGNIFICANT CHANGE UP
PH UR: 7 — SIGNIFICANT CHANGE UP (ref 5–8)
PLATELET # BLD AUTO: 212 K/UL — SIGNIFICANT CHANGE UP (ref 150–400)
POTASSIUM SERPL-MCNC: 3.7 MMOL/L — SIGNIFICANT CHANGE UP (ref 3.5–5.3)
POTASSIUM SERPL-SCNC: 3.7 MMOL/L — SIGNIFICANT CHANGE UP (ref 3.5–5.3)
PROT SERPL-MCNC: 7.2 G/DL — SIGNIFICANT CHANGE UP (ref 6.6–8.7)
PROT UR-MCNC: NEGATIVE MG/DL — SIGNIFICANT CHANGE UP
RBC # BLD: 4.32 M/UL — LOW (ref 4.4–5.2)
RBC # FLD: 13.1 % — SIGNIFICANT CHANGE UP (ref 11–15.6)
RBC CASTS # UR COMP ASSIST: SIGNIFICANT CHANGE UP /HPF (ref 0–4)
SODIUM SERPL-SCNC: 134 MMOL/L — LOW (ref 135–145)
SP GR SPEC: 1 — LOW (ref 1.01–1.02)
UROBILINOGEN FLD QL: NEGATIVE MG/DL — SIGNIFICANT CHANGE UP
WBC # BLD: 11.3 K/UL — HIGH (ref 4.8–10.8)
WBC # FLD AUTO: 11.3 K/UL — HIGH (ref 4.8–10.8)
WBC UR QL: SIGNIFICANT CHANGE UP

## 2018-04-23 PROCEDURE — 93010 ELECTROCARDIOGRAM REPORT: CPT

## 2018-04-23 RX ORDER — CIPROFLOXACIN LACTATE 400MG/40ML
400 VIAL (ML) INTRAVENOUS ONCE
Qty: 0 | Refills: 0 | Status: COMPLETED | OUTPATIENT
Start: 2018-04-23 | End: 2018-04-23

## 2018-04-23 RX ORDER — ACETAMINOPHEN 500 MG
650 TABLET ORAL ONCE
Qty: 0 | Refills: 0 | Status: COMPLETED | OUTPATIENT
Start: 2018-04-23 | End: 2018-04-23

## 2018-04-23 RX ORDER — KETOROLAC TROMETHAMINE 30 MG/ML
30 SYRINGE (ML) INJECTION ONCE
Qty: 0 | Refills: 0 | Status: DISCONTINUED | OUTPATIENT
Start: 2018-04-23 | End: 2018-04-23

## 2018-04-23 RX ORDER — SODIUM CHLORIDE 9 MG/ML
3 INJECTION INTRAMUSCULAR; INTRAVENOUS; SUBCUTANEOUS ONCE
Qty: 0 | Refills: 0 | Status: COMPLETED | OUTPATIENT
Start: 2018-04-23 | End: 2018-04-23

## 2018-04-23 RX ORDER — SODIUM CHLORIDE 9 MG/ML
500 INJECTION INTRAMUSCULAR; INTRAVENOUS; SUBCUTANEOUS
Qty: 0 | Refills: 0 | Status: COMPLETED | OUTPATIENT
Start: 2018-04-23 | End: 2018-04-23

## 2018-04-23 RX ADMIN — Medication 30 MILLIGRAM(S): at 23:12

## 2018-04-23 RX ADMIN — Medication 650 MILLIGRAM(S): at 23:12

## 2018-04-23 RX ADMIN — SODIUM CHLORIDE 2000 MILLILITER(S): 9 INJECTION INTRAMUSCULAR; INTRAVENOUS; SUBCUTANEOUS at 23:21

## 2018-04-23 RX ADMIN — Medication 30 MILLIGRAM(S): at 23:40

## 2018-04-23 RX ADMIN — SODIUM CHLORIDE 2000 MILLILITER(S): 9 INJECTION INTRAMUSCULAR; INTRAVENOUS; SUBCUTANEOUS at 23:00

## 2018-04-23 RX ADMIN — SODIUM CHLORIDE 2000 MILLILITER(S): 9 INJECTION INTRAMUSCULAR; INTRAVENOUS; SUBCUTANEOUS at 23:12

## 2018-04-23 RX ADMIN — Medication 200 MILLIGRAM(S): at 23:12

## 2018-04-23 RX ADMIN — SODIUM CHLORIDE 2000 MILLILITER(S): 9 INJECTION INTRAMUSCULAR; INTRAVENOUS; SUBCUTANEOUS at 23:40

## 2018-04-23 RX ADMIN — SODIUM CHLORIDE 3 MILLILITER(S): 9 INJECTION INTRAMUSCULAR; INTRAVENOUS; SUBCUTANEOUS at 23:12

## 2018-04-23 NOTE — ED ADULT NURSE NOTE - ED STAT RN HANDOFF DETAILS
pt admitted, bed available on 6twr. report given to Shy JOHNSON, pt accepted. pt a+ox4, lying comfortably in bed. pt requesting pain medication, will rpt BP and medicate prior to transporting to floor.

## 2018-04-23 NOTE — ED ADULT NURSE NOTE - OBJECTIVE STATEMENT
pt arrived was seen in ed yesterday for infection, sent home on antibotics, pt states feeling worse, with fever, chills and severe abd pain an dback pain pt arrived was seen in ed yesterday for infection, sent home on antibotics, pt states feeling worse, with fever, chills and severe abd pain and back pain. code sepsis called by MD Zimmerman, code team @ bedside.

## 2018-04-23 NOTE — ED ADULT TRIAGE NOTE - CHIEF COMPLAINT QUOTE
patient was seen here yesterday for abdominal pain r/o kidney stones, patient states same abdominal pain that is getting worse- "feels like it is pulling" has nausea vomiting and HA,

## 2018-04-23 NOTE — ED PROVIDER NOTE - OBJECTIVE STATEMENT
A 57 year old female pt presents to the ED c/o abd pain. The pt was seen in the ED secondary to lower abdominal pain and dysuria with associated fever. Pt was prescribed keflex which she took today but states that it has not helped, with worsening pain today. She has been taking Tylenol and Motrin, taken 4 and 8 hours ago respectively, with no relief. Pt has not been eating secondary to pain and nausea. In the ED, pt was noted to be tachycardic with a fever of 102.8 F. Code: Sepsis activated in the ED.

## 2018-04-23 NOTE — ED ADULT NURSE NOTE - ED STAT RN HANDOFF DETAILS 2
pt a+ox4, VSS and in no apparent distress or discomfort. pt medicated for pain prior to transport. pt transported to floor in stable condition.

## 2018-04-24 ENCOUNTER — INPATIENT (INPATIENT)
Facility: HOSPITAL | Age: 58
LOS: 2 days | Discharge: HOME | DRG: 872 | End: 2018-04-27
Attending: INTERNAL MEDICINE | Admitting: HOSPITALIST
Payer: MEDICAID

## 2018-04-24 DIAGNOSIS — A41.9 SEPSIS, UNSPECIFIED ORGANISM: ICD-10-CM

## 2018-04-24 DIAGNOSIS — Z98.89 OTHER SPECIFIED POSTPROCEDURAL STATES: Chronic | ICD-10-CM

## 2018-04-24 DIAGNOSIS — N12 TUBULO-INTERSTITIAL NEPHRITIS, NOT SPECIFIED AS ACUTE OR CHRONIC: ICD-10-CM

## 2018-04-24 DIAGNOSIS — Z90.710 ACQUIRED ABSENCE OF BOTH CERVIX AND UTERUS: Chronic | ICD-10-CM

## 2018-04-24 DIAGNOSIS — Z87.828 PERSONAL HISTORY OF OTHER (HEALED) PHYSICAL INJURY AND TRAUMA: Chronic | ICD-10-CM

## 2018-04-24 DIAGNOSIS — E11.9 TYPE 2 DIABETES MELLITUS WITHOUT COMPLICATIONS: ICD-10-CM

## 2018-04-24 LAB
-  AMIKACIN: SIGNIFICANT CHANGE UP
-  AMPICILLIN/SULBACTAM: SIGNIFICANT CHANGE UP
-  AMPICILLIN: SIGNIFICANT CHANGE UP
-  AZTREONAM: SIGNIFICANT CHANGE UP
-  CEFAZOLIN: SIGNIFICANT CHANGE UP
-  CEFEPIME: SIGNIFICANT CHANGE UP
-  CEFOXITIN: SIGNIFICANT CHANGE UP
-  CEFTRIAXONE: SIGNIFICANT CHANGE UP
-  CIPROFLOXACIN: SIGNIFICANT CHANGE UP
-  ERTAPENEM: SIGNIFICANT CHANGE UP
-  GENTAMICIN: SIGNIFICANT CHANGE UP
-  IMIPENEM: SIGNIFICANT CHANGE UP
-  LEVOFLOXACIN: SIGNIFICANT CHANGE UP
-  MEROPENEM: SIGNIFICANT CHANGE UP
-  NITROFURANTOIN: SIGNIFICANT CHANGE UP
-  PIPERACILLIN/TAZOBACTAM: SIGNIFICANT CHANGE UP
-  TIGECYCLINE: SIGNIFICANT CHANGE UP
-  TOBRAMYCIN: SIGNIFICANT CHANGE UP
-  TRIMETHOPRIM/SULFAMETHOXAZOLE: SIGNIFICANT CHANGE UP
ANION GAP SERPL CALC-SCNC: 10 MMOL/L — SIGNIFICANT CHANGE UP (ref 5–17)
BASOPHILS # BLD AUTO: 0 K/UL — SIGNIFICANT CHANGE UP (ref 0–0.2)
BASOPHILS NFR BLD AUTO: 0.2 % — SIGNIFICANT CHANGE UP (ref 0–2)
BUN SERPL-MCNC: 8 MG/DL — SIGNIFICANT CHANGE UP (ref 8–20)
CALCIUM SERPL-MCNC: 8.2 MG/DL — LOW (ref 8.6–10.2)
CHLORIDE SERPL-SCNC: 104 MMOL/L — SIGNIFICANT CHANGE UP (ref 98–107)
CO2 SERPL-SCNC: 27 MMOL/L — SIGNIFICANT CHANGE UP (ref 22–29)
CREAT SERPL-MCNC: 0.54 MG/DL — SIGNIFICANT CHANGE UP (ref 0.5–1.3)
CULTURE RESULTS: SIGNIFICANT CHANGE UP
EOSINOPHIL # BLD AUTO: 0.1 K/UL — SIGNIFICANT CHANGE UP (ref 0–0.5)
EOSINOPHIL NFR BLD AUTO: 1.1 % — SIGNIFICANT CHANGE UP (ref 0–6)
GLUCOSE BLDC GLUCOMTR-MCNC: 158 MG/DL — HIGH (ref 70–99)
GLUCOSE BLDC GLUCOMTR-MCNC: 174 MG/DL — HIGH (ref 70–99)
GLUCOSE BLDC GLUCOMTR-MCNC: 193 MG/DL — HIGH (ref 70–99)
GLUCOSE BLDC GLUCOMTR-MCNC: 201 MG/DL — HIGH (ref 70–99)
GLUCOSE SERPL-MCNC: 151 MG/DL — HIGH (ref 70–115)
HBA1C BLD-MCNC: 9.9 % — HIGH (ref 4–5.6)
HCT VFR BLD CALC: 35.3 % — LOW (ref 37–47)
HGB BLD-MCNC: 11.6 G/DL — LOW (ref 12–16)
LYMPHOCYTES # BLD AUTO: 1.8 K/UL — SIGNIFICANT CHANGE UP (ref 1–4.8)
LYMPHOCYTES # BLD AUTO: 16.7 % — LOW (ref 20–55)
MAGNESIUM SERPL-MCNC: 1.5 MG/DL — LOW (ref 1.6–2.6)
MCHC RBC-ENTMCNC: 29.3 PG — SIGNIFICANT CHANGE UP (ref 27–31)
MCHC RBC-ENTMCNC: 32.9 G/DL — SIGNIFICANT CHANGE UP (ref 32–36)
MCV RBC AUTO: 89.1 FL — SIGNIFICANT CHANGE UP (ref 81–99)
METHOD TYPE: SIGNIFICANT CHANGE UP
MONOCYTES # BLD AUTO: 1.2 K/UL — HIGH (ref 0–0.8)
MONOCYTES NFR BLD AUTO: 10.9 % — HIGH (ref 3–10)
NEUTROPHILS # BLD AUTO: 7.7 K/UL — SIGNIFICANT CHANGE UP (ref 1.8–8)
NEUTROPHILS NFR BLD AUTO: 70.8 % — SIGNIFICANT CHANGE UP (ref 37–73)
ORGANISM # SPEC MICROSCOPIC CNT: SIGNIFICANT CHANGE UP
ORGANISM # SPEC MICROSCOPIC CNT: SIGNIFICANT CHANGE UP
PLATELET # BLD AUTO: 181 K/UL — SIGNIFICANT CHANGE UP (ref 150–400)
POTASSIUM SERPL-MCNC: 3.9 MMOL/L — SIGNIFICANT CHANGE UP (ref 3.5–5.3)
POTASSIUM SERPL-SCNC: 3.9 MMOL/L — SIGNIFICANT CHANGE UP (ref 3.5–5.3)
RBC # BLD: 3.96 M/UL — LOW (ref 4.4–5.2)
RBC # FLD: 13.3 % — SIGNIFICANT CHANGE UP (ref 11–15.6)
SODIUM SERPL-SCNC: 141 MMOL/L — SIGNIFICANT CHANGE UP (ref 135–145)
SPECIMEN SOURCE: SIGNIFICANT CHANGE UP
WBC # BLD: 10.9 K/UL — HIGH (ref 4.8–10.8)
WBC # FLD AUTO: 10.9 K/UL — HIGH (ref 4.8–10.8)

## 2018-04-24 PROCEDURE — 74177 CT ABD & PELVIS W/CONTRAST: CPT | Mod: 26

## 2018-04-24 PROCEDURE — 99223 1ST HOSP IP/OBS HIGH 75: CPT

## 2018-04-24 PROCEDURE — 12345: CPT | Mod: NC

## 2018-04-24 RX ORDER — FLUOXETINE HCL 10 MG
50 CAPSULE ORAL DAILY
Qty: 0 | Refills: 0 | Status: DISCONTINUED | OUTPATIENT
Start: 2018-04-24 | End: 2018-04-27

## 2018-04-24 RX ORDER — OXYCODONE AND ACETAMINOPHEN 5; 325 MG/1; MG/1
1 TABLET ORAL ONCE
Qty: 0 | Refills: 0 | Status: DISCONTINUED | OUTPATIENT
Start: 2018-04-24 | End: 2018-04-24

## 2018-04-24 RX ORDER — CEFTRIAXONE 500 MG/1
1000 INJECTION, POWDER, FOR SOLUTION INTRAMUSCULAR; INTRAVENOUS ONCE
Qty: 0 | Refills: 0 | Status: COMPLETED | OUTPATIENT
Start: 2018-04-24 | End: 2018-04-24

## 2018-04-24 RX ORDER — ENOXAPARIN SODIUM 100 MG/ML
40 INJECTION SUBCUTANEOUS DAILY
Qty: 0 | Refills: 0 | Status: DISCONTINUED | OUTPATIENT
Start: 2018-04-24 | End: 2018-04-27

## 2018-04-24 RX ORDER — OXYCODONE AND ACETAMINOPHEN 5; 325 MG/1; MG/1
1 TABLET ORAL EVERY 4 HOURS
Qty: 0 | Refills: 0 | Status: DISCONTINUED | OUTPATIENT
Start: 2018-04-24 | End: 2018-04-27

## 2018-04-24 RX ORDER — MAGNESIUM SULFATE 500 MG/ML
2 VIAL (ML) INJECTION ONCE
Qty: 0 | Refills: 0 | Status: COMPLETED | OUTPATIENT
Start: 2018-04-24 | End: 2018-04-24

## 2018-04-24 RX ORDER — QUETIAPINE FUMARATE 200 MG/1
150 TABLET, FILM COATED ORAL DAILY
Qty: 0 | Refills: 0 | Status: DISCONTINUED | OUTPATIENT
Start: 2018-04-24 | End: 2018-04-27

## 2018-04-24 RX ORDER — INSULIN LISPRO 100/ML
VIAL (ML) SUBCUTANEOUS
Qty: 0 | Refills: 0 | Status: DISCONTINUED | OUTPATIENT
Start: 2018-04-24 | End: 2018-04-27

## 2018-04-24 RX ORDER — OXYCODONE AND ACETAMINOPHEN 5; 325 MG/1; MG/1
2 TABLET ORAL EVERY 4 HOURS
Qty: 0 | Refills: 0 | Status: DISCONTINUED | OUTPATIENT
Start: 2018-04-24 | End: 2018-04-24

## 2018-04-24 RX ORDER — CEFTRIAXONE 500 MG/1
INJECTION, POWDER, FOR SOLUTION INTRAMUSCULAR; INTRAVENOUS
Qty: 0 | Refills: 0 | Status: DISCONTINUED | OUTPATIENT
Start: 2018-04-24 | End: 2018-04-24

## 2018-04-24 RX ORDER — ACETAMINOPHEN 500 MG
650 TABLET ORAL EVERY 6 HOURS
Qty: 0 | Refills: 0 | Status: DISCONTINUED | OUTPATIENT
Start: 2018-04-24 | End: 2018-04-27

## 2018-04-24 RX ORDER — GABAPENTIN 400 MG/1
600 CAPSULE ORAL THREE TIMES A DAY
Qty: 0 | Refills: 0 | Status: DISCONTINUED | OUTPATIENT
Start: 2018-04-24 | End: 2018-04-27

## 2018-04-24 RX ORDER — ONDANSETRON 8 MG/1
4 TABLET, FILM COATED ORAL EVERY 6 HOURS
Qty: 0 | Refills: 0 | Status: DISCONTINUED | OUTPATIENT
Start: 2018-04-24 | End: 2018-04-27

## 2018-04-24 RX ORDER — CEFTRIAXONE 500 MG/1
1000 INJECTION, POWDER, FOR SOLUTION INTRAMUSCULAR; INTRAVENOUS EVERY 24 HOURS
Qty: 0 | Refills: 0 | Status: DISCONTINUED | OUTPATIENT
Start: 2018-04-25 | End: 2018-04-27

## 2018-04-24 RX ORDER — LISINOPRIL 2.5 MG/1
2.5 TABLET ORAL DAILY
Qty: 0 | Refills: 0 | Status: DISCONTINUED | OUTPATIENT
Start: 2018-04-24 | End: 2018-04-27

## 2018-04-24 RX ORDER — CEFTRIAXONE 500 MG/1
INJECTION, POWDER, FOR SOLUTION INTRAMUSCULAR; INTRAVENOUS
Qty: 0 | Refills: 0 | Status: DISCONTINUED | OUTPATIENT
Start: 2018-04-24 | End: 2018-04-27

## 2018-04-24 RX ORDER — INSULIN GLARGINE 100 [IU]/ML
10 INJECTION, SOLUTION SUBCUTANEOUS AT BEDTIME
Qty: 0 | Refills: 0 | Status: DISCONTINUED | OUTPATIENT
Start: 2018-04-24 | End: 2018-04-27

## 2018-04-24 RX ORDER — SACCHAROMYCES BOULARDII 250 MG
250 POWDER IN PACKET (EA) ORAL
Qty: 0 | Refills: 0 | Status: DISCONTINUED | OUTPATIENT
Start: 2018-04-24 | End: 2018-04-27

## 2018-04-24 RX ORDER — ASPIRIN/CALCIUM CARB/MAGNESIUM 324 MG
81 TABLET ORAL DAILY
Qty: 0 | Refills: 0 | Status: DISCONTINUED | OUTPATIENT
Start: 2018-04-24 | End: 2018-04-27

## 2018-04-24 RX ORDER — CHOLECALCIFEROL (VITAMIN D3) 125 MCG
1000 CAPSULE ORAL DAILY
Qty: 0 | Refills: 0 | Status: DISCONTINUED | OUTPATIENT
Start: 2018-04-24 | End: 2018-04-27

## 2018-04-24 RX ORDER — SODIUM CHLORIDE 9 MG/ML
1000 INJECTION INTRAMUSCULAR; INTRAVENOUS; SUBCUTANEOUS
Qty: 0 | Refills: 0 | Status: COMPLETED | OUTPATIENT
Start: 2018-04-24 | End: 2018-04-26

## 2018-04-24 RX ORDER — HYDROMORPHONE HYDROCHLORIDE 2 MG/ML
0.5 INJECTION INTRAMUSCULAR; INTRAVENOUS; SUBCUTANEOUS ONCE
Qty: 0 | Refills: 0 | Status: DISCONTINUED | OUTPATIENT
Start: 2018-04-24 | End: 2018-04-24

## 2018-04-24 RX ORDER — SIMVASTATIN 20 MG/1
20 TABLET, FILM COATED ORAL AT BEDTIME
Qty: 0 | Refills: 0 | Status: DISCONTINUED | OUTPATIENT
Start: 2018-04-24 | End: 2018-04-27

## 2018-04-24 RX ORDER — KETOROLAC TROMETHAMINE 30 MG/ML
30 SYRINGE (ML) INJECTION EVERY 6 HOURS
Qty: 0 | Refills: 0 | Status: DISCONTINUED | OUTPATIENT
Start: 2018-04-24 | End: 2018-04-25

## 2018-04-24 RX ORDER — MAGNESIUM OXIDE 400 MG ORAL TABLET 241.3 MG
400 TABLET ORAL
Qty: 0 | Refills: 0 | Status: DISCONTINUED | OUTPATIENT
Start: 2018-04-24 | End: 2018-04-27

## 2018-04-24 RX ADMIN — Medication 250 MILLIGRAM(S): at 06:08

## 2018-04-24 RX ADMIN — Medication 50 GRAM(S): at 15:55

## 2018-04-24 RX ADMIN — SODIUM CHLORIDE 125 MILLILITER(S): 9 INJECTION INTRAMUSCULAR; INTRAVENOUS; SUBCUTANEOUS at 06:08

## 2018-04-24 RX ADMIN — OXYCODONE AND ACETAMINOPHEN 1 TABLET(S): 5; 325 TABLET ORAL at 22:03

## 2018-04-24 RX ADMIN — SODIUM CHLORIDE 125 MILLILITER(S): 9 INJECTION INTRAMUSCULAR; INTRAVENOUS; SUBCUTANEOUS at 08:06

## 2018-04-24 RX ADMIN — Medication 50 MILLIGRAM(S): at 11:52

## 2018-04-24 RX ADMIN — OXYCODONE AND ACETAMINOPHEN 2 TABLET(S): 5; 325 TABLET ORAL at 11:00

## 2018-04-24 RX ADMIN — Medication 650 MILLIGRAM(S): at 10:07

## 2018-04-24 RX ADMIN — Medication 81 MILLIGRAM(S): at 12:21

## 2018-04-24 RX ADMIN — Medication 250 MILLIGRAM(S): at 17:20

## 2018-04-24 RX ADMIN — GABAPENTIN 600 MILLIGRAM(S): 400 CAPSULE ORAL at 04:42

## 2018-04-24 RX ADMIN — GABAPENTIN 600 MILLIGRAM(S): 400 CAPSULE ORAL at 13:52

## 2018-04-24 RX ADMIN — Medication 1: at 08:07

## 2018-04-24 RX ADMIN — SIMVASTATIN 20 MILLIGRAM(S): 20 TABLET, FILM COATED ORAL at 21:14

## 2018-04-24 RX ADMIN — Medication 1000 UNIT(S): at 12:21

## 2018-04-24 RX ADMIN — OXYCODONE AND ACETAMINOPHEN 1 TABLET(S): 5; 325 TABLET ORAL at 01:27

## 2018-04-24 RX ADMIN — SODIUM CHLORIDE 125 MILLILITER(S): 9 INJECTION INTRAMUSCULAR; INTRAVENOUS; SUBCUTANEOUS at 16:39

## 2018-04-24 RX ADMIN — Medication 1: at 16:37

## 2018-04-24 RX ADMIN — OXYCODONE AND ACETAMINOPHEN 2 TABLET(S): 5; 325 TABLET ORAL at 10:05

## 2018-04-24 RX ADMIN — Medication 1: at 11:40

## 2018-04-24 RX ADMIN — QUETIAPINE FUMARATE 150 MILLIGRAM(S): 200 TABLET, FILM COATED ORAL at 12:22

## 2018-04-24 RX ADMIN — MAGNESIUM OXIDE 400 MG ORAL TABLET 400 MILLIGRAM(S): 241.3 TABLET ORAL at 16:37

## 2018-04-24 RX ADMIN — SODIUM CHLORIDE 2000 MILLILITER(S): 9 INJECTION INTRAMUSCULAR; INTRAVENOUS; SUBCUTANEOUS at 00:20

## 2018-04-24 RX ADMIN — HYDROMORPHONE HYDROCHLORIDE 0.5 MILLIGRAM(S): 2 INJECTION INTRAMUSCULAR; INTRAVENOUS; SUBCUTANEOUS at 05:07

## 2018-04-24 RX ADMIN — INSULIN GLARGINE 10 UNIT(S): 100 INJECTION, SOLUTION SUBCUTANEOUS at 21:12

## 2018-04-24 RX ADMIN — OXYCODONE AND ACETAMINOPHEN 1 TABLET(S): 5; 325 TABLET ORAL at 02:15

## 2018-04-24 RX ADMIN — OXYCODONE AND ACETAMINOPHEN 1 TABLET(S): 5; 325 TABLET ORAL at 17:24

## 2018-04-24 RX ADMIN — ENOXAPARIN SODIUM 40 MILLIGRAM(S): 100 INJECTION SUBCUTANEOUS at 11:43

## 2018-04-24 RX ADMIN — MAGNESIUM OXIDE 400 MG ORAL TABLET 400 MILLIGRAM(S): 241.3 TABLET ORAL at 13:52

## 2018-04-24 RX ADMIN — ONDANSETRON 4 MILLIGRAM(S): 8 TABLET, FILM COATED ORAL at 05:07

## 2018-04-24 RX ADMIN — CEFTRIAXONE 1000 MILLIGRAM(S): 500 INJECTION, POWDER, FOR SOLUTION INTRAMUSCULAR; INTRAVENOUS at 04:42

## 2018-04-24 RX ADMIN — Medication 650 MILLIGRAM(S): at 09:42

## 2018-04-24 RX ADMIN — OXYCODONE AND ACETAMINOPHEN 1 TABLET(S): 5; 325 TABLET ORAL at 23:02

## 2018-04-24 RX ADMIN — Medication 650 MILLIGRAM(S): at 23:31

## 2018-04-24 RX ADMIN — Medication 650 MILLIGRAM(S): at 17:20

## 2018-04-24 RX ADMIN — GABAPENTIN 600 MILLIGRAM(S): 400 CAPSULE ORAL at 21:13

## 2018-04-24 RX ADMIN — Medication 2: at 21:13

## 2018-04-24 NOTE — H&P ADULT - FAMILY HISTORY
Mother  Still living? Unknown  Family history of diabetes mellitus, Age at diagnosis: Age Unknown  Family history of hypertension, Age at diagnosis: Age Unknown     Sibling  Still living? Unknown  Family history of diabetes mellitus, Age at diagnosis: Age Unknown  Family history of rheumatoid arthritis, Age at diagnosis: Age Unknown  Family history of asthma, Age at diagnosis: Age Unknown     Father  Still living? Unknown  Family history of hypertension, Age at diagnosis: Age Unknown  Family history of heart disease, Age at diagnosis: Age Unknown

## 2018-04-24 NOTE — ED ADULT NURSE REASSESSMENT NOTE - NS ED NURSE REASSESS COMMENT FT1
pt a+ox4, reports relief of pain and discomfort. pt resting comfortably, awaiting results. will continue to monitor.

## 2018-04-24 NOTE — H&P ADULT - NSHPSOCIALHISTORY_GEN_ALL_CORE
Unemployed.  Smokes 4 cigarettes/day since the age of 17 years.  Drinks alcohol socially.  No illicit drug use.

## 2018-04-24 NOTE — H&P ADULT - ASSESSMENT
57 years old female with PMH of Recurrent UTIs (has 2 ureters on right side), DM, HLD, Bipolar Disorder, Depression and Anxiety comes with abdominal pain. As per patient, pain started two days ago - mostly in right flank region, 10/10 in intensity, sharp/pulling in nature and radiating to lower abdomen and right leg. She came to ER on same day and after evaluation was sent home on Keflex. She started Keflex yesterday but was not feeling well. Pain was persistent and she developed fever (101.6) at home and was feeling nauseous so she came back to the ER. She is complaining of increased urinary frequency but denies any other urinary symptoms. 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 As per patient, pain started two days ago - mostly in right flank region, 10/10 in intensity, sharp/pulling in nature and radiating to lower abdomen and right leg. She came to ER on same day and after evaluation was sent home on Keflex. She started Keflex yesterday but was not feeling well. Pain was persistent and she developed fever (101.6) at home and was feeling nauseous so she came back to the ER. She is complaining of increased urinary frequency but denies any other urinary symptoms.     1) Right Pyelonephritis  - Cultures sent  - Continue IVF  - Rocephin 1 gm   - Florastor 250 mg  2) DM  - HbA1c  - Accu checks and RISS  3) HLD  - Continue Simvastatin   4) Bipolar Disorder/Depression/Anxiety  - Continue Seroquel 150 mg and Prozac 50 mg  DVT Prophylaxis -- Lovenox 40 mg

## 2018-04-24 NOTE — PATIENT PROFILE ADULT. - NS TRANSFER PATIENT BELONGINGS
Jewelry/Money (specify)/3 rings/Clothing/Cell Phone/PDA (specify)
Clothing/Cell Phone/PDA (specify)/3 gold rings/Jewelry/Money (specify)

## 2018-04-24 NOTE — H&P ADULT - NSHPPHYSICALEXAM_GEN_ALL_CORE
Vital Signs   T(C): 37.2 (24 Apr 2018 02:12), Max: 39.3 (23 Apr 2018 21:21)  T(F): 98.9 (24 Apr 2018 02:12), Max: 102.8 (23 Apr 2018 21:21)  HR: 68 (24 Apr 2018 04:42) (68 - 130)  BP: 94/52 (24 Apr 2018 04:42) (90/55 - 119/72)  RR: 18 (24 Apr 2018 04:42) (18 - 20)  SpO2: 99% (24 Apr 2018 04:42) (94% - 99%)  General: Well developed. Well nourished. No acute distress  HEENT: PERRLA. EOMI. Clear conjunctivae. Moist mucus membrane  Neck: Supple. No JVD. No Thyromegaly   Chest: CTA bilaterally - no wheezing, rales or rhonchi.   Heart: Normal S1 & S2 with RRR. No murmur.   Abdomen: Soft. Non-tender. Non-distended. + BS  Back: + CVA tenderness on right side  Ext: No pedal edema. No calf tenderness   Neuro: AAO x 3, No focal deficit, CN II-XII grossly WNL and no speech disorder  Skin: Warm and Dry  Psychiatry: Normal mood and affect

## 2018-04-24 NOTE — H&P ADULT - HISTORY OF PRESENT ILLNESS
57 years old female with PMH of Recurrent UTIs (has 2 ureters on right side), DM, HLD, Bipolar Disorder, Depression and Anxiety comes with abdominal pain. As per patient, pain started two days ago - mostly in right flank region, 10/10 in intensity, sharp/pulling in nature and radiating to lower abdomen and right leg. She came to ER on same day and after evaluation was sent home on Keflex. She started Keflex yesterday but was not feeling well. Pain was persistent and she developed fever (101.6) at home and was feeling nauseous so she came back to the ER. She is complaining of increased urinary frequency but denies any other urinary symptoms. 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 As per patient, pain started two days ago - mostly in right flank region, 10/10 in intensity, sharp/pulling in nature and radiating to lower abdomen and right leg. She came to ER on same day and after evaluation was sent home on Keflex. She started Keflex yesterday but was not feeling well. Pain was persistent and she developed fever (101.6) at home and was feeling nauseous so she came back to the ER. She is complaining of increased urinary frequency but denies any other urinary symptoms.

## 2018-04-24 NOTE — H&P ADULT - NSHPLABSRESULTS_GEN_ALL_CORE
LABS:                        12.7   11.3  )-----------( 212      ( 23 Apr 2018 23:06 )             37.9     04-23    134<L>  |  94<L>  |  8.0  ----------------------------<  233<H>  3.7   |  25.0  |  0.52    Ca    9.1      23 Apr 2018 23:06    TPro  7.2  /  Alb  4.4  /  TBili  0.3<L>  /  DBili  x   /  AST  14  /  ALT  15  /  AlkPhos  50  04-23    PT/INR - ( 22 Apr 2018 21:05 )   PT: 10.1 sec;   INR: 0.92 ratio         PTT - ( 22 Apr 2018 21:05 )  PTT:30.1 sec      Blood Culture: 04-22 @ 21:04  Organism --  Gram Stain Blood -- Gram Stain --  Specimen Source .Urine Clean Catch (Midstream)  Culture-Blood --    CT Abdomen and Pelvis w/ IV Cont (04.24.18 @ 01:19)  Right hydroureter with urothelial wall thickening and enhancement, right striated nephrogram, small perinephric stranding/fluid of the duplicated right collecting system likely reflects changes from ascending urinary tract infection/pyelonephritis. Correlate with urinalysis. Follow-up to resolution.

## 2018-04-25 LAB
ANION GAP SERPL CALC-SCNC: 11 MMOL/L — SIGNIFICANT CHANGE UP (ref 5–17)
BUN SERPL-MCNC: 6 MG/DL — LOW (ref 8–20)
CALCIUM SERPL-MCNC: 8 MG/DL — LOW (ref 8.6–10.2)
CHLORIDE SERPL-SCNC: 103 MMOL/L — SIGNIFICANT CHANGE UP (ref 98–107)
CO2 SERPL-SCNC: 25 MMOL/L — SIGNIFICANT CHANGE UP (ref 22–29)
CREAT SERPL-MCNC: 0.43 MG/DL — LOW (ref 0.5–1.3)
CULTURE RESULTS: NO GROWTH — SIGNIFICANT CHANGE UP
GLUCOSE BLDC GLUCOMTR-MCNC: 161 MG/DL — HIGH (ref 70–99)
GLUCOSE BLDC GLUCOMTR-MCNC: 165 MG/DL — HIGH (ref 70–99)
GLUCOSE BLDC GLUCOMTR-MCNC: 221 MG/DL — HIGH (ref 70–99)
GLUCOSE BLDC GLUCOMTR-MCNC: 265 MG/DL — HIGH (ref 70–99)
GLUCOSE SERPL-MCNC: 143 MG/DL — HIGH (ref 70–115)
HCT VFR BLD CALC: 32.9 % — LOW (ref 37–47)
HGB BLD-MCNC: 10.8 G/DL — LOW (ref 12–16)
MAGNESIUM SERPL-MCNC: 2.1 MG/DL — SIGNIFICANT CHANGE UP (ref 1.8–2.6)
MCHC RBC-ENTMCNC: 29 PG — SIGNIFICANT CHANGE UP (ref 27–31)
MCHC RBC-ENTMCNC: 32.8 G/DL — SIGNIFICANT CHANGE UP (ref 32–36)
MCV RBC AUTO: 88.4 FL — SIGNIFICANT CHANGE UP (ref 81–99)
PLATELET # BLD AUTO: 183 K/UL — SIGNIFICANT CHANGE UP (ref 150–400)
POTASSIUM SERPL-MCNC: 3.2 MMOL/L — LOW (ref 3.5–5.3)
POTASSIUM SERPL-SCNC: 3.2 MMOL/L — LOW (ref 3.5–5.3)
RBC # BLD: 3.72 M/UL — LOW (ref 4.4–5.2)
RBC # FLD: 13.2 % — SIGNIFICANT CHANGE UP (ref 11–15.6)
SODIUM SERPL-SCNC: 139 MMOL/L — SIGNIFICANT CHANGE UP (ref 135–145)
SPECIMEN SOURCE: SIGNIFICANT CHANGE UP
WBC # BLD: 10.3 K/UL — SIGNIFICANT CHANGE UP (ref 4.8–10.8)
WBC # FLD AUTO: 10.3 K/UL — SIGNIFICANT CHANGE UP (ref 4.8–10.8)

## 2018-04-25 PROCEDURE — 99233 SBSQ HOSP IP/OBS HIGH 50: CPT

## 2018-04-25 RX ORDER — OXYCODONE HYDROCHLORIDE 5 MG/1
10 TABLET ORAL
Qty: 0 | Refills: 0 | Status: DISCONTINUED | OUTPATIENT
Start: 2018-04-25 | End: 2018-04-27

## 2018-04-25 RX ORDER — TRAMADOL HYDROCHLORIDE 50 MG/1
50 TABLET ORAL
Qty: 0 | Refills: 0 | Status: DISCONTINUED | OUTPATIENT
Start: 2018-04-25 | End: 2018-04-26

## 2018-04-25 RX ORDER — KETOROLAC TROMETHAMINE 30 MG/ML
30 SYRINGE (ML) INJECTION EVERY 6 HOURS
Qty: 0 | Refills: 0 | Status: DISCONTINUED | OUTPATIENT
Start: 2018-04-25 | End: 2018-04-26

## 2018-04-25 RX ORDER — TRAMADOL HYDROCHLORIDE 50 MG/1
50 TABLET ORAL
Qty: 0 | Refills: 0 | Status: DISCONTINUED | OUTPATIENT
Start: 2018-04-25 | End: 2018-04-25

## 2018-04-25 RX ORDER — POTASSIUM CHLORIDE 20 MEQ
40 PACKET (EA) ORAL EVERY 4 HOURS
Qty: 0 | Refills: 0 | Status: COMPLETED | OUTPATIENT
Start: 2018-04-25 | End: 2018-04-25

## 2018-04-25 RX ORDER — SODIUM CHLORIDE 9 MG/ML
500 INJECTION INTRAMUSCULAR; INTRAVENOUS; SUBCUTANEOUS ONCE
Qty: 0 | Refills: 0 | Status: COMPLETED | OUTPATIENT
Start: 2018-04-25 | End: 2018-04-25

## 2018-04-25 RX ORDER — ALBUTEROL 90 UG/1
2.5 AEROSOL, METERED ORAL ONCE
Qty: 0 | Refills: 0 | Status: DISCONTINUED | OUTPATIENT
Start: 2018-04-25 | End: 2018-04-26

## 2018-04-25 RX ADMIN — OXYCODONE AND ACETAMINOPHEN 1 TABLET(S): 5; 325 TABLET ORAL at 07:53

## 2018-04-25 RX ADMIN — MAGNESIUM OXIDE 400 MG ORAL TABLET 400 MILLIGRAM(S): 241.3 TABLET ORAL at 17:04

## 2018-04-25 RX ADMIN — Medication 3: at 21:29

## 2018-04-25 RX ADMIN — GABAPENTIN 600 MILLIGRAM(S): 400 CAPSULE ORAL at 13:39

## 2018-04-25 RX ADMIN — OXYCODONE HYDROCHLORIDE 10 MILLIGRAM(S): 5 TABLET ORAL at 20:31

## 2018-04-25 RX ADMIN — Medication 30 MILLIGRAM(S): at 11:04

## 2018-04-25 RX ADMIN — GABAPENTIN 600 MILLIGRAM(S): 400 CAPSULE ORAL at 04:14

## 2018-04-25 RX ADMIN — Medication 81 MILLIGRAM(S): at 11:03

## 2018-04-25 RX ADMIN — Medication 250 MILLIGRAM(S): at 04:14

## 2018-04-25 RX ADMIN — OXYCODONE AND ACETAMINOPHEN 1 TABLET(S): 5; 325 TABLET ORAL at 09:00

## 2018-04-25 RX ADMIN — TRAMADOL HYDROCHLORIDE 50 MILLIGRAM(S): 50 TABLET ORAL at 11:05

## 2018-04-25 RX ADMIN — Medication 1: at 16:19

## 2018-04-25 RX ADMIN — Medication 30 MILLIGRAM(S): at 12:12

## 2018-04-25 RX ADMIN — Medication 30 MILLIGRAM(S): at 23:57

## 2018-04-25 RX ADMIN — ENOXAPARIN SODIUM 40 MILLIGRAM(S): 100 INJECTION SUBCUTANEOUS at 11:03

## 2018-04-25 RX ADMIN — Medication 50 MILLIGRAM(S): at 11:04

## 2018-04-25 RX ADMIN — INSULIN GLARGINE 10 UNIT(S): 100 INJECTION, SOLUTION SUBCUTANEOUS at 21:28

## 2018-04-25 RX ADMIN — GABAPENTIN 600 MILLIGRAM(S): 400 CAPSULE ORAL at 20:31

## 2018-04-25 RX ADMIN — Medication 30 MILLIGRAM(S): at 06:01

## 2018-04-25 RX ADMIN — Medication 1000 UNIT(S): at 11:03

## 2018-04-25 RX ADMIN — Medication 30 MILLIGRAM(S): at 18:54

## 2018-04-25 RX ADMIN — QUETIAPINE FUMARATE 150 MILLIGRAM(S): 200 TABLET, FILM COATED ORAL at 11:04

## 2018-04-25 RX ADMIN — Medication 30 MILLIGRAM(S): at 18:12

## 2018-04-25 RX ADMIN — MAGNESIUM OXIDE 400 MG ORAL TABLET 400 MILLIGRAM(S): 241.3 TABLET ORAL at 12:14

## 2018-04-25 RX ADMIN — LISINOPRIL 2.5 MILLIGRAM(S): 2.5 TABLET ORAL at 04:14

## 2018-04-25 RX ADMIN — Medication 40 MILLIEQUIVALENT(S): at 13:39

## 2018-04-25 RX ADMIN — Medication 1: at 07:48

## 2018-04-25 RX ADMIN — Medication 30 MILLIGRAM(S): at 05:46

## 2018-04-25 RX ADMIN — OXYCODONE HYDROCHLORIDE 10 MILLIGRAM(S): 5 TABLET ORAL at 21:31

## 2018-04-25 RX ADMIN — OXYCODONE HYDROCHLORIDE 10 MILLIGRAM(S): 5 TABLET ORAL at 13:39

## 2018-04-25 RX ADMIN — Medication 250 MILLIGRAM(S): at 17:04

## 2018-04-25 RX ADMIN — TRAMADOL HYDROCHLORIDE 50 MILLIGRAM(S): 50 TABLET ORAL at 09:56

## 2018-04-25 RX ADMIN — CEFTRIAXONE 1000 MILLIGRAM(S): 500 INJECTION, POWDER, FOR SOLUTION INTRAMUSCULAR; INTRAVENOUS at 04:13

## 2018-04-25 RX ADMIN — OXYCODONE AND ACETAMINOPHEN 1 TABLET(S): 5; 325 TABLET ORAL at 05:12

## 2018-04-25 RX ADMIN — MAGNESIUM OXIDE 400 MG ORAL TABLET 400 MILLIGRAM(S): 241.3 TABLET ORAL at 07:53

## 2018-04-25 RX ADMIN — Medication 40 MILLIEQUIVALENT(S): at 09:56

## 2018-04-25 RX ADMIN — OXYCODONE HYDROCHLORIDE 10 MILLIGRAM(S): 5 TABLET ORAL at 12:17

## 2018-04-25 RX ADMIN — Medication 2: at 12:15

## 2018-04-25 RX ADMIN — OXYCODONE AND ACETAMINOPHEN 1 TABLET(S): 5; 325 TABLET ORAL at 04:12

## 2018-04-25 RX ADMIN — SIMVASTATIN 20 MILLIGRAM(S): 20 TABLET, FILM COATED ORAL at 20:31

## 2018-04-25 RX ADMIN — SODIUM CHLORIDE 1000 MILLILITER(S): 9 INJECTION INTRAMUSCULAR; INTRAVENOUS; SUBCUTANEOUS at 16:27

## 2018-04-25 NOTE — ADVANCED PRACTICE NURSE CONSULT - RECOMMEDATIONS
continue diabetes self management educaiton  pt to draw and inject all insulin doses while in the hospital using insulin syringe and rn supervision  [facility] home care pt is new to insulin

## 2018-04-25 NOTE — CHART NOTE - NSCHARTNOTEFT_GEN_A_CORE
Pt A+OX3 cooperative , NAD, Pt COPD + Smoker, Pt states she uses albuterol neb at home. Pt states she feels like she needs neb txment now.   %  NAD lungs clear   continue to monitor  Albuterol neb treatment X 1  call pa if any changes in pts condition

## 2018-04-26 DIAGNOSIS — F17.200 NICOTINE DEPENDENCE, UNSPECIFIED, UNCOMPLICATED: ICD-10-CM

## 2018-04-26 DIAGNOSIS — B96.1 KLEBSIELLA PNEUMONIAE [K. PNEUMONIAE] AS THE CAUSE OF DISEASES CLASSIFIED ELSEWHERE: ICD-10-CM

## 2018-04-26 LAB
ANION GAP SERPL CALC-SCNC: 11 MMOL/L — SIGNIFICANT CHANGE UP (ref 5–17)
BASOPHILS # BLD AUTO: 0 K/UL — SIGNIFICANT CHANGE UP (ref 0–0.2)
BASOPHILS NFR BLD AUTO: 0.1 % — SIGNIFICANT CHANGE UP (ref 0–2)
BUN SERPL-MCNC: 7 MG/DL — LOW (ref 8–20)
CALCIUM SERPL-MCNC: 8.3 MG/DL — LOW (ref 8.6–10.2)
CHLORIDE SERPL-SCNC: 107 MMOL/L — SIGNIFICANT CHANGE UP (ref 98–107)
CO2 SERPL-SCNC: 24 MMOL/L — SIGNIFICANT CHANGE UP (ref 22–29)
CREAT SERPL-MCNC: 0.42 MG/DL — LOW (ref 0.5–1.3)
EOSINOPHIL # BLD AUTO: 0.2 K/UL — SIGNIFICANT CHANGE UP (ref 0–0.5)
EOSINOPHIL NFR BLD AUTO: 2.1 % — SIGNIFICANT CHANGE UP (ref 0–6)
GLUCOSE BLDC GLUCOMTR-MCNC: 155 MG/DL — HIGH (ref 70–99)
GLUCOSE BLDC GLUCOMTR-MCNC: 168 MG/DL — HIGH (ref 70–99)
GLUCOSE BLDC GLUCOMTR-MCNC: 203 MG/DL — HIGH (ref 70–99)
GLUCOSE BLDC GLUCOMTR-MCNC: 247 MG/DL — HIGH (ref 70–99)
GLUCOSE SERPL-MCNC: 143 MG/DL — HIGH (ref 70–115)
HCT VFR BLD CALC: 32.1 % — LOW (ref 37–47)
HGB BLD-MCNC: 10.4 G/DL — LOW (ref 12–16)
LYMPHOCYTES # BLD AUTO: 1.6 K/UL — SIGNIFICANT CHANGE UP (ref 1–4.8)
LYMPHOCYTES # BLD AUTO: 22 % — SIGNIFICANT CHANGE UP (ref 20–55)
MCHC RBC-ENTMCNC: 28.7 PG — SIGNIFICANT CHANGE UP (ref 27–31)
MCHC RBC-ENTMCNC: 32.4 G/DL — SIGNIFICANT CHANGE UP (ref 32–36)
MCV RBC AUTO: 88.7 FL — SIGNIFICANT CHANGE UP (ref 81–99)
MONOCYTES # BLD AUTO: 0.4 K/UL — SIGNIFICANT CHANGE UP (ref 0–0.8)
MONOCYTES NFR BLD AUTO: 5.6 % — SIGNIFICANT CHANGE UP (ref 3–10)
NEUTROPHILS # BLD AUTO: 5 K/UL — SIGNIFICANT CHANGE UP (ref 1.8–8)
NEUTROPHILS NFR BLD AUTO: 70.1 % — SIGNIFICANT CHANGE UP (ref 37–73)
PLATELET # BLD AUTO: 175 K/UL — SIGNIFICANT CHANGE UP (ref 150–400)
POTASSIUM SERPL-MCNC: 4.3 MMOL/L — SIGNIFICANT CHANGE UP (ref 3.5–5.3)
POTASSIUM SERPL-SCNC: 4.3 MMOL/L — SIGNIFICANT CHANGE UP (ref 3.5–5.3)
RBC # BLD: 3.62 M/UL — LOW (ref 4.4–5.2)
RBC # FLD: 13.3 % — SIGNIFICANT CHANGE UP (ref 11–15.6)
SODIUM SERPL-SCNC: 142 MMOL/L — SIGNIFICANT CHANGE UP (ref 135–145)
WBC # BLD: 7.1 K/UL — SIGNIFICANT CHANGE UP (ref 4.8–10.8)
WBC # FLD AUTO: 7.1 K/UL — SIGNIFICANT CHANGE UP (ref 4.8–10.8)

## 2018-04-26 PROCEDURE — 99222 1ST HOSP IP/OBS MODERATE 55: CPT

## 2018-04-26 PROCEDURE — 99232 SBSQ HOSP IP/OBS MODERATE 35: CPT

## 2018-04-26 RX ORDER — SENNA PLUS 8.6 MG/1
2 TABLET ORAL AT BEDTIME
Qty: 0 | Refills: 0 | Status: DISCONTINUED | OUTPATIENT
Start: 2018-04-26 | End: 2018-04-27

## 2018-04-26 RX ORDER — IPRATROPIUM/ALBUTEROL SULFATE 18-103MCG
3 AEROSOL WITH ADAPTER (GRAM) INHALATION EVERY 6 HOURS
Qty: 0 | Refills: 0 | Status: DISCONTINUED | OUTPATIENT
Start: 2018-04-26 | End: 2018-04-27

## 2018-04-26 RX ORDER — POLYETHYLENE GLYCOL 3350 17 G/17G
17 POWDER, FOR SOLUTION ORAL DAILY
Qty: 0 | Refills: 0 | Status: DISCONTINUED | OUTPATIENT
Start: 2018-04-26 | End: 2018-04-27

## 2018-04-26 RX ADMIN — Medication 3 MILLILITER(S): at 11:48

## 2018-04-26 RX ADMIN — OXYCODONE HYDROCHLORIDE 10 MILLIGRAM(S): 5 TABLET ORAL at 21:30

## 2018-04-26 RX ADMIN — Medication 1: at 22:15

## 2018-04-26 RX ADMIN — GABAPENTIN 600 MILLIGRAM(S): 400 CAPSULE ORAL at 04:57

## 2018-04-26 RX ADMIN — OXYCODONE HYDROCHLORIDE 10 MILLIGRAM(S): 5 TABLET ORAL at 07:36

## 2018-04-26 RX ADMIN — GABAPENTIN 600 MILLIGRAM(S): 400 CAPSULE ORAL at 20:54

## 2018-04-26 RX ADMIN — Medication 1: at 07:33

## 2018-04-26 RX ADMIN — MAGNESIUM OXIDE 400 MG ORAL TABLET 400 MILLIGRAM(S): 241.3 TABLET ORAL at 11:07

## 2018-04-26 RX ADMIN — SODIUM CHLORIDE 125 MILLILITER(S): 9 INJECTION INTRAMUSCULAR; INTRAVENOUS; SUBCUTANEOUS at 20:53

## 2018-04-26 RX ADMIN — OXYCODONE HYDROCHLORIDE 10 MILLIGRAM(S): 5 TABLET ORAL at 12:59

## 2018-04-26 RX ADMIN — MAGNESIUM OXIDE 400 MG ORAL TABLET 400 MILLIGRAM(S): 241.3 TABLET ORAL at 07:33

## 2018-04-26 RX ADMIN — OXYCODONE HYDROCHLORIDE 10 MILLIGRAM(S): 5 TABLET ORAL at 04:18

## 2018-04-26 RX ADMIN — Medication 250 MILLIGRAM(S): at 17:31

## 2018-04-26 RX ADMIN — Medication 2: at 16:05

## 2018-04-26 RX ADMIN — GABAPENTIN 600 MILLIGRAM(S): 400 CAPSULE ORAL at 13:00

## 2018-04-26 RX ADMIN — OXYCODONE HYDROCHLORIDE 10 MILLIGRAM(S): 5 TABLET ORAL at 20:53

## 2018-04-26 RX ADMIN — INSULIN GLARGINE 10 UNIT(S): 100 INJECTION, SOLUTION SUBCUTANEOUS at 22:15

## 2018-04-26 RX ADMIN — QUETIAPINE FUMARATE 150 MILLIGRAM(S): 200 TABLET, FILM COATED ORAL at 11:07

## 2018-04-26 RX ADMIN — POLYETHYLENE GLYCOL 3350 17 GRAM(S): 17 POWDER, FOR SOLUTION ORAL at 10:41

## 2018-04-26 RX ADMIN — SIMVASTATIN 20 MILLIGRAM(S): 20 TABLET, FILM COATED ORAL at 20:53

## 2018-04-26 RX ADMIN — Medication 30 MILLIGRAM(S): at 00:13

## 2018-04-26 RX ADMIN — Medication 250 MILLIGRAM(S): at 04:56

## 2018-04-26 RX ADMIN — SENNA PLUS 2 TABLET(S): 8.6 TABLET ORAL at 20:54

## 2018-04-26 RX ADMIN — OXYCODONE AND ACETAMINOPHEN 1 TABLET(S): 5; 325 TABLET ORAL at 17:10

## 2018-04-26 RX ADMIN — OXYCODONE HYDROCHLORIDE 10 MILLIGRAM(S): 5 TABLET ORAL at 14:00

## 2018-04-26 RX ADMIN — Medication 50 MILLIGRAM(S): at 11:06

## 2018-04-26 RX ADMIN — OXYCODONE HYDROCHLORIDE 10 MILLIGRAM(S): 5 TABLET ORAL at 03:18

## 2018-04-26 RX ADMIN — CEFTRIAXONE 1000 MILLIGRAM(S): 500 INJECTION, POWDER, FOR SOLUTION INTRAMUSCULAR; INTRAVENOUS at 04:56

## 2018-04-26 RX ADMIN — ENOXAPARIN SODIUM 40 MILLIGRAM(S): 100 INJECTION SUBCUTANEOUS at 11:07

## 2018-04-26 RX ADMIN — MAGNESIUM OXIDE 400 MG ORAL TABLET 400 MILLIGRAM(S): 241.3 TABLET ORAL at 17:30

## 2018-04-26 RX ADMIN — Medication 650 MILLIGRAM(S): at 20:53

## 2018-04-26 RX ADMIN — Medication 30 MILLIGRAM(S): at 04:57

## 2018-04-26 RX ADMIN — Medication 2: at 11:06

## 2018-04-26 RX ADMIN — Medication 81 MILLIGRAM(S): at 11:06

## 2018-04-26 RX ADMIN — OXYCODONE AND ACETAMINOPHEN 1 TABLET(S): 5; 325 TABLET ORAL at 16:01

## 2018-04-26 RX ADMIN — OXYCODONE HYDROCHLORIDE 10 MILLIGRAM(S): 5 TABLET ORAL at 08:19

## 2018-04-26 RX ADMIN — Medication 1000 UNIT(S): at 11:06
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
## 2018-04-26 NOTE — CDI QUERY NOTE - NSCDIOTHERTXTBX_GEN_ALL_CORE_HH
Hypokalemia    K 3.2 (4/25), Tx with KCL, repeat K 4.3 (4/26).  Please provide a dx for abnormal lab with tx provided if clinically significant.

## 2018-04-26 NOTE — CONSULT NOTE ADULT - ASSESSMENT
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

## 2018-04-26 NOTE — CDI QUERY NOTE - NSCDIOTHERTXTBX_GEN_ALL_CORE_HH
Hypomagnesemia    Magnesium 1.5 (4/24), tx with MgS04, repeat Mg 2.1 (4/25).  Please provide a dx for abnormal lab with tx provided if clinically significant.

## 2018-04-26 NOTE — CONSULT NOTE ADULT - PROBLEM SELECTOR RECOMMENDATION 9
- continue ceftriaxone IV while in hospital.  for discharge, would given Cipro 500mg pO BID x   7 more days to complete course of treatment for her Klebsiella infection

## 2018-04-27 ENCOUNTER — TRANSCRIPTION ENCOUNTER (OUTPATIENT)
Age: 58
End: 2018-04-27

## 2018-04-27 VITALS — DIASTOLIC BLOOD PRESSURE: 57 MMHG | HEART RATE: 72 BPM | TEMPERATURE: 98 F | SYSTOLIC BLOOD PRESSURE: 91 MMHG

## 2018-04-27 LAB
GLUCOSE BLDC GLUCOMTR-MCNC: 150 MG/DL — HIGH (ref 70–99)
GLUCOSE BLDC GLUCOMTR-MCNC: 163 MG/DL — HIGH (ref 70–99)

## 2018-04-27 PROCEDURE — 99239 HOSP IP/OBS DSCHRG MGMT >30: CPT

## 2018-04-27 PROCEDURE — 99232 SBSQ HOSP IP/OBS MODERATE 35: CPT

## 2018-04-27 RX ORDER — SACCHAROMYCES BOULARDII 250 MG
1 POWDER IN PACKET (EA) ORAL
Qty: 14 | Refills: 0 | OUTPATIENT
Start: 2018-04-27 | End: 2018-05-03

## 2018-04-27 RX ORDER — EPINEPHRINE 0.3 MG/.3ML
3 INJECTION INTRAMUSCULAR; SUBCUTANEOUS
Qty: 60 | Refills: 0 | OUTPATIENT
Start: 2018-04-27 | End: 2018-05-26

## 2018-04-27 RX ORDER — BUDESONIDE AND FORMOTEROL FUMARATE DIHYDRATE 160; 4.5 UG/1; UG/1
1 AEROSOL RESPIRATORY (INHALATION)
Qty: 1 | Refills: 0 | OUTPATIENT
Start: 2018-04-27 | End: 2018-05-26

## 2018-04-27 RX ORDER — CIPROFLOXACIN LACTATE 400MG/40ML
1 VIAL (ML) INTRAVENOUS
Qty: 7 | Refills: 0 | OUTPATIENT
Start: 2018-04-27 | End: 2018-05-03

## 2018-04-27 RX ADMIN — OXYCODONE AND ACETAMINOPHEN 1 TABLET(S): 5; 325 TABLET ORAL at 11:29

## 2018-04-27 RX ADMIN — OXYCODONE AND ACETAMINOPHEN 1 TABLET(S): 5; 325 TABLET ORAL at 05:27

## 2018-04-27 RX ADMIN — OXYCODONE HYDROCHLORIDE 10 MILLIGRAM(S): 5 TABLET ORAL at 01:09

## 2018-04-27 RX ADMIN — Medication 81 MILLIGRAM(S): at 11:29

## 2018-04-27 RX ADMIN — MAGNESIUM OXIDE 400 MG ORAL TABLET 400 MILLIGRAM(S): 241.3 TABLET ORAL at 07:48

## 2018-04-27 RX ADMIN — Medication 250 MILLIGRAM(S): at 05:27

## 2018-04-27 RX ADMIN — OXYCODONE AND ACETAMINOPHEN 1 TABLET(S): 5; 325 TABLET ORAL at 06:15

## 2018-04-27 RX ADMIN — Medication 650 MILLIGRAM(S): at 07:48

## 2018-04-27 RX ADMIN — Medication 650 MILLIGRAM(S): at 09:50

## 2018-04-27 RX ADMIN — OXYCODONE HYDROCHLORIDE 10 MILLIGRAM(S): 5 TABLET ORAL at 02:00

## 2018-04-27 RX ADMIN — Medication 50 MILLIGRAM(S): at 11:29

## 2018-04-27 RX ADMIN — MAGNESIUM OXIDE 400 MG ORAL TABLET 400 MILLIGRAM(S): 241.3 TABLET ORAL at 11:30

## 2018-04-27 RX ADMIN — LISINOPRIL 2.5 MILLIGRAM(S): 2.5 TABLET ORAL at 05:27

## 2018-04-27 RX ADMIN — QUETIAPINE FUMARATE 150 MILLIGRAM(S): 200 TABLET, FILM COATED ORAL at 11:30

## 2018-04-27 RX ADMIN — POLYETHYLENE GLYCOL 3350 17 GRAM(S): 17 POWDER, FOR SOLUTION ORAL at 11:29

## 2018-04-27 RX ADMIN — Medication 1: at 12:22

## 2018-04-27 RX ADMIN — GABAPENTIN 600 MILLIGRAM(S): 400 CAPSULE ORAL at 05:27

## 2018-04-27 RX ADMIN — ENOXAPARIN SODIUM 40 MILLIGRAM(S): 100 INJECTION SUBCUTANEOUS at 11:31

## 2018-04-27 RX ADMIN — CEFTRIAXONE 1000 MILLIGRAM(S): 500 INJECTION, POWDER, FOR SOLUTION INTRAMUSCULAR; INTRAVENOUS at 05:27

## 2018-04-27 NOTE — DISCHARGE NOTE ADULT - MEDICATION SUMMARY - MEDICATIONS TO TAKE
I will START or STAY ON the medications listed below when I get home from the hospital:    aspirin 81 mg oral tablet  -- 1 tab(s) by mouth once a day  -- Indication: For Primary prevention    oxyCODONE-acetaminophen 5 mg-325 mg oral tablet  -- 1 tab(s) by mouth every 6 hours, As Needed -Moderate Pain (4 - 6) MDD:4  -- Indication: For Pain    lisinopril 2.5 mg oral tablet  -- 1 tab(s) by mouth once a day  -- Indication: For Hypertension    gabapentin 300 mg oral capsule  -- 2 tab(s) by mouth 3 times a day  -- Indication: For neuropathy    PROzac  -- 50 milligram(s) by mouth once a day  -- Indication: For depression    metFORMIN 1000 mg oral tablet  -- 1 tab(s) by mouth 2 times a day  -- Indication: For diabetes    HumuLIN 70/30 subcutaneous suspension  -- 15 unit(s) subcutaneous 2 times a day  -- Indication: For diabetes    simvastatin 20 mg oral tablet  -- 1 tab(s) by mouth once a day (at bedtime)  -- Indication: For High cholesterol    SEROquel 150 mg oral tablet, extended release  -- 1 tab(s) by mouth once a day (in the evening)  -- Indication: For depresion    ProAir HFA CFC free 90 mcg/inh inhalation aerosol  -- 2 puff(s) inhaled 4 times a day, As Needed  -- Indication: For Bronchitis    albuterol 1.25 mg/3 mL (0.042%) inhalation solution  -- 3 milliliter(s) by nebulizer 4 times a day, As Needed -for shortness of breath and/or wheezing   -- For inhalation only.  It is very important that you take or use this exactly as directed.  Do not skip doses or discontinue unless directed by your doctor.  Obtain medical advice before taking any non-prescription drugs as some may affect the action of this medication.    -- Indication: For Bronchitis    Symbicort 160 mcg-4.5 mcg/inh inhalation aerosol  -- 1 puff(s) inhaled 2 times a day   -- Check with your doctor before becoming pregnant.  For inhalation only.  Rinse mouth thoroughly after use.    -- Indication: For Bronchitis    methocarbamol 750 mg oral tablet  -- 1 tab(s) by mouth 2 times a day  -- May cause drowsiness.  Alcohol may intensify this effect.  Use care when operating dangerous machinery.    -- Indication: For muscle relaxant    saccharomyces boulardii lyo 250 mg oral capsule  -- 1 cap(s) by mouth 2 times a day  -- Indication: For Probiotic    levoFLOXacin 750 mg oral tablet  -- 1 tab(s) by mouth every 24 hours  -- Indication: For Pyelonephritis    Vitamin D3 1000 intl units oral tablet  -- 1 tab(s) by mouth once a day  -- Indication: For vitamin

## 2018-04-27 NOTE — PROGRESS NOTE ADULT - PROBLEM SELECTOR PROBLEM 3
Type 2 diabetes mellitus without complication, with long-term current use of insulin

## 2018-04-27 NOTE — DISCHARGE NOTE ADULT - SECONDARY DIAGNOSIS.
Sepsis, due to unspecified organism Smoker Uncontrolled type 2 diabetes mellitus with diabetic polyneuropathy, with long-term current use of insulin

## 2018-04-27 NOTE — PROGRESS NOTE ADULT - PROBLEM SELECTOR PLAN 3
- needs good glycemic control.
hga1c 9.9  DM education  states uses 70/30 on sliding scale.
hga1c 9.9  DM education  states uses 70/30 on sliding scale.
hga1c 9.9  DM education  states uses 70/30 on sliding scale.  will dc on standing 70/30 and outpatient endo follow up

## 2018-04-27 NOTE — DISCHARGE NOTE ADULT - CARE PROVIDERS DIRECT ADDRESSES
,DirectAddress_Unknown,[email or web address],DirectAddress_Unknown

## 2018-04-27 NOTE — DISCHARGE NOTE ADULT - HOSPITAL COURSE
58 yo F w/ hx Recurrent UTIs (has duplicated right collecting system), DM2, HLD, Bipolar Disorder, Depression and Anxiety presents with abdominal pain. recently seen in ER and discharged on keflex for UTI.  patient follows with Dr Hameed and supposed to be on prophylactic macrobid.     Patient found to have sepsis due to pyelonephritis. negative blood culture and improved on iv ceftriaxone with transition to oral levaquin per ID recommendations.  Patient complaining of cough/sob at times with history of recently treated bronchitis with prednisone and Z pack.  Patient is active smoker and cessation advised.  Levaquin should cover bronchitis if any and inhalers/nebs provided.  Underwent diabetes education and advised to use 70/30 insulin twice daily instead of sliding scale method. endocrinology follow up advised.    Stable for discharge home. d/c planning 35 min.  VSS afebrile.AAxo3, NAD RRR S1s2,   Rt CVA tenderness to deep palpation.   no LE edema. nonfocal neuro.

## 2018-04-27 NOTE — DISCHARGE NOTE ADULT - CARE PLAN
Principal Discharge DX:	Pyelonephritis  Goal:	resolution and prevention  Assessment and plan of treatment:	finish course of antibiotics  follow up with Dr Arriaga for further management as should be on chronic suppressive antibiotics.  Secondary Diagnosis:	Sepsis, due to unspecified organism  Assessment and plan of treatment:	see above   resolved  Secondary Diagnosis:	Smoker  Assessment and plan of treatment:	cessation advised.  inhalers provided.  follow up with pulmonology in 2-3 weeks.  Secondary Diagnosis:	Uncontrolled type 2 diabetes mellitus with diabetic polyneuropathy, with long-term current use of insulin  Assessment and plan of treatment:	hemoglobin a1c 9.9  poor control  frequent glucose monitoring.  please use 70/30 insulin twice daily at 15 Units.  follow up with endocrinology in 1 week.

## 2018-04-27 NOTE — DISCHARGE NOTE ADULT - CARE PROVIDER_API CALL
Shima Hussein), EndocrinologyMetabDiabetes  [address], [place] [id number]  Phone: [phone]  Fax: [phone]    Kavon Robertson (), Critical Care Medicine; Internal Medicine; Pulmonary Disease  [address] [facility]  [address]  [place] [address]  Phone: [phone]  Fax: [phone]    Oskar Arriaga), Urology  [address] [place] [address]  Phone: [phone]  Fax: [phone]

## 2018-04-27 NOTE — DISCHARGE NOTE ADULT - PATIENT PORTAL LINK FT
You can access the [facility] Patient Portal, offered by [facility], by registering with the following website: [email or web address]

## 2018-04-27 NOTE — PROGRESS NOTE ADULT - ASSESSMENT
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
56 yo F w/ hx Recurrent UTIs (has duplicated right collecting system), DM2, HLD, Bipolar Disorder, Depression and Anxiety presents with abdominal pain. recently seen in ER and discharged on keflex.  patient follows with Dr Hameed and supposed to be on prophylactic macrobid.
56 yo F w/ hx Recurrent UTIs (has duplicated right collecting system), DM2, HLD, Bipolar Disorder, Depression and Anxiety presents with abdominal pain. recently seen in ER and discharged on keflex.  patient follows with Dr Hameed and supposed to be on prophylactic macrobid.
58 yo F w/ hx Recurrent UTIs (has duplicated right collecting system), DM2, HLD, Bipolar Disorder, Depression and Anxiety presents with abdominal pain. recently seen in ER and discharged on keflex.  patient follows with Dr Hameed and supposed to be on prophylactic macrobid.

## 2018-04-27 NOTE — PROGRESS NOTE ADULT - SUBJECTIVE AND OBJECTIVE BOX
[facility]  INFECTIOUS DISEASES AND INTERNAL MEDICINE at [place]  =======================================================  Ozzie Grossman MD [facility]   Raza Johnson MD  Diplomates American Board of Internal Medicine and Infectious Diseases  =======================================================    ALISON PICHARDOHA [id number]  chief complaint: follow up on right sided pyelonephritis, cough    patient seen and examined in follow up.  Chart and labs reviewed.     still with some flank pain but better.   cough no productive,.     =======================================================  Allergies:  No Known Allergies      =======================================================  Medications:  acetaminophen   Tablet 650 milliGRAM(s) Oral every 6 hours PRN  acetaminophen   Tablet. 650 milliGRAM(s) Oral every 6 hours PRN  ALBUTerol/ipratropium for Nebulization 3 milliLiter(s) Nebulizer every 6 hours PRN  aspirin  chewable 81 milliGRAM(s) Oral daily  cholecalciferol 1000 Unit(s) Oral daily  enoxaparin Injectable 40 milliGRAM(s) SubCutaneous daily  FLUoxetine 50 milliGRAM(s) Oral daily  gabapentin 600 milliGRAM(s) Oral three times a day  insulin glargine Injectable (LANTUS) 10 Unit(s) SubCutaneous at bedtime  insulin lispro (HumaLOG) corrective regimen sliding scale   SubCutaneous Before meals and at bedtime  levoFLOXacin  Tablet 750 milliGRAM(s) Oral every 24 hours  lisinopril 2.5 milliGRAM(s) Oral daily  magnesium oxide 400 milliGRAM(s) Oral three times a day with meals  ondansetron Injectable 4 milliGRAM(s) IV Push every 6 hours PRN  oxyCODONE    5 mG/acetaminophen 325 mG 1 Tablet(s) Oral every 4 hours PRN  oxyCODONE    IR 10 milliGRAM(s) Oral four times a day PRN  polyethylene glycol 3350 17 Gram(s) Oral daily  QUEtiapine 150 milliGRAM(s) Oral daily  saccharomyces boulardii 250 milliGRAM(s) Oral two times a day  senna 2 Tablet(s) Oral at bedtime  simvastatin 20 milliGRAM(s) Oral at bedtime       =======================================================     REVIEW OF SYSTEMS:  as above  all other ROS negative  =======================================================    Physical Exam:    Vital Signs Last 24 Hrs  T(C): 37.1 (27 Apr 2018 08:00), Max: 37.8 (26 Apr 2018 20:54)  T(F): 98.8 (27 Apr 2018 08:00), Max: 100.1 (26 Apr 2018 20:54)  HR: 68 (27 Apr 2018 08:00) (68 - 92)  BP: 131/72 (27 Apr 2018 08:00) (127/74 - 147/73)  RR: 18 (27 Apr 2018 08:00) (14 - 18)  SpO2: 97% (27 Apr 2018 08:00) (93% - 97%)    GEN: NAD, pleasant, Obese  HEENT: normocephalic and atraumatic. EOMI. KRIS.    NECK: Supple. No carotid bruits.  No lymphadenopathy or thyromegaly.  LUNGS: Clear to auscultation.  HEART: Regular rate and rhythm without murmur.  ABDOMEN: Soft, nontender, and nondistended.  Positive bowel sounds.    : MILD RIGHT CVA tenderness  EXTREMITIES: Without any cyanosis, clubbing, rash, lesions or edema.  MSK: no joint swelling  NEUROLOGIC: Cranial nerves II through XII are grossly intact.  PSYCHIATRIC: Appropriate affect .  SKIN: No ulceration or induration present.    =======================================================    Labs:  04-26    142  |  107  |  7.0<L>  ----------------------------<  143<H>  4.3   |  24.0  |  0.42<L>    Ca    8.3<L>      26 Apr 2018 07:58                            10.4   7.1   )-----------( 175      ( 26 Apr 2018 07:58 )             32.1             RECENT CULTURES:  04-25 @ 08:48 .Blood Blood-Peripheral     No growth at 48 hours        04-23 @ 23:17 .Urine Clean Catch (Midstream)     No growth        04-23 @ 23:07 .Blood Blood-Peripheral     No growth at 48 hours        04-23 @ 23:06 .Blood Blood-Peripheral     No growth at 48 hours        04-22 @ 21:04 .Urine Clean Catch (Midstream) Klebsiella pneumoniae    >100,000 CFU/ml Klebsiella pneumoniae
seen for pyelo    complaining of worsening Rt flank pain.  persistent fevers  no nausea. no dysuria.  ros otherwise negative     MEDICATIONS  (STANDING):  aspirin  chewable 81 milliGRAM(s) Oral daily  cefTRIAXone Injectable.      cefTRIAXone Injectable. 1000 milliGRAM(s) IV Push every 24 hours  cholecalciferol 1000 Unit(s) Oral daily  enoxaparin Injectable 40 milliGRAM(s) SubCutaneous daily  FLUoxetine 50 milliGRAM(s) Oral daily  gabapentin 600 milliGRAM(s) Oral three times a day  insulin glargine Injectable (LANTUS) 10 Unit(s) SubCutaneous at bedtime  insulin lispro (HumaLOG) corrective regimen sliding scale   SubCutaneous Before meals and at bedtime  ketorolac   Injectable 30 milliGRAM(s) IV Push every 6 hours  lisinopril 2.5 milliGRAM(s) Oral daily  magnesium oxide 400 milliGRAM(s) Oral three times a day with meals  potassium chloride    Tablet ER 40 milliEquivalent(s) Oral every 4 hours  QUEtiapine 150 milliGRAM(s) Oral daily  saccharomyces boulardii 250 milliGRAM(s) Oral two times a day  simvastatin 20 milliGRAM(s) Oral at bedtime  sodium chloride 0.9%. 1000 milliLiter(s) (125 mL/Hr) IV Continuous <Continuous>    MEDICATIONS  (PRN):  acetaminophen   Tablet 650 milliGRAM(s) Oral every 6 hours PRN For Temp greater than 38 C (100.4 F)  acetaminophen   Tablet. 650 milliGRAM(s) Oral every 6 hours PRN Mild Pain (1 - 3)  ondansetron Injectable 4 milliGRAM(s) IV Push every 6 hours PRN Nausea and/or Vomiting  oxyCODONE    5 mG/acetaminophen 325 mG 1 Tablet(s) Oral every 4 hours PRN Moderate Pain (4 - 6)  oxyCODONE    IR 10 milliGRAM(s) Oral four times a day PRN Severe Pain (7 - 10)  traMADol 50 milliGRAM(s) Oral four times a day PRN Moderate Pain (4 - 6)      Allergies    No Known Allergies    Vital Signs Last 24 Hrs  T(C): 36.9 (2018 08:22), Max: 39.3 (2018 17:13)  T(F): 98.5 (2018 08:22), Max: 102.8 (2018 17:13)  HR: 68 (2018 08:22) (68 - 85)  BP: 90/56 (2018 08:22) (90/56 - 120/65)  BP(mean): --  RR: 18 (2018 08:22) (15 - 18)  SpO2: 97% (2018 08:22) (90% - 98%)    PHYSICAL EXAM:    GENERAL: mod distress 2/2 pain   CHEST/LUNG: ctab  HEART: Regular rate and rhythm; S1 S2  ABDOMEN: Soft, Nontender, Nondistended; Bowel sounds present  EXTREMITIES:  no edema  : right CVA TTP  NERVOUS SYSTEM:  Alert & Oriented X3, nonfocal  PSYCH: normal mood, appropriate response.    LABS:                        10.8   10.3  )-----------( 183      ( 2018 08:47 )             32.9     04-25    139  |  103  |  6.0<L>  ----------------------------<  143<H>  3.2<L>   |  25.0  |  0.43<L>    Ca    8.0<L>      2018 08:47  Mg     2.1     04-25    TPro  7.2  /  Alb  4.4  /  TBili  0.3<L>  /  DBili  x   /  AST  14  /  ALT  15  /  AlkPhos  50  04-      Urinalysis Basic - ( 2018 23:18 )    Color: Yellow / Appearance: Clear / S.005 / pH: x  Gluc: x / Ketone: Negative  / Bili: Negative / Urobili: Negative mg/dL   Blood: x / Protein: Negative mg/dL / Nitrite: Negative   Leuk Esterase: Trace / RBC: 0-2 /HPF / WBC 3-5   Sq Epi: x / Non Sq Epi: Few / Bacteria: x        CAPILLARY BLOOD GLUCOSE      POCT Blood Glucose.: 165 mg/dL (2018 07:46)  POCT Blood Glucose.: 201 mg/dL (2018 21:12)  POCT Blood Glucose.: 174 mg/dL (2018 16:36)  POCT Blood Glucose.: 193 mg/dL (2018 11:36)        RADIOLOGY & ADDITIONAL TESTS:
seen for pyelo    improving right flank pain.  no dysuria.  intermittent cough--states "smokers" cough.  ROS otherwise negative aside from constipation    MEDICATIONS  (STANDING):  aspirin  chewable 81 milliGRAM(s) Oral daily  cefTRIAXone Injectable.      cefTRIAXone Injectable. 1000 milliGRAM(s) IV Push every 24 hours  cholecalciferol 1000 Unit(s) Oral daily  enoxaparin Injectable 40 milliGRAM(s) SubCutaneous daily  FLUoxetine 50 milliGRAM(s) Oral daily  gabapentin 600 milliGRAM(s) Oral three times a day  insulin glargine Injectable (LANTUS) 10 Unit(s) SubCutaneous at bedtime  insulin lispro (HumaLOG) corrective regimen sliding scale   SubCutaneous Before meals and at bedtime  lisinopril 2.5 milliGRAM(s) Oral daily  magnesium oxide 400 milliGRAM(s) Oral three times a day with meals  QUEtiapine 150 milliGRAM(s) Oral daily  saccharomyces boulardii 250 milliGRAM(s) Oral two times a day  simvastatin 20 milliGRAM(s) Oral at bedtime  sodium chloride 0.9%. 1000 milliLiter(s) (125 mL/Hr) IV Continuous <Continuous>    MEDICATIONS  (PRN):  acetaminophen   Tablet 650 milliGRAM(s) Oral every 6 hours PRN For Temp greater than 38 C (100.4 F)  acetaminophen   Tablet. 650 milliGRAM(s) Oral every 6 hours PRN Mild Pain (1 - 3)  ALBUTerol/ipratropium for Nebulization 3 milliLiter(s) Nebulizer every 6 hours PRN Shortness of Breath and/or Wheezing  ondansetron Injectable 4 milliGRAM(s) IV Push every 6 hours PRN Nausea and/or Vomiting  oxyCODONE    5 mG/acetaminophen 325 mG 1 Tablet(s) Oral every 4 hours PRN Moderate Pain (4 - 6)  oxyCODONE    IR 10 milliGRAM(s) Oral four times a day PRN Severe Pain (7 - 10)  traMADol 50 milliGRAM(s) Oral four times a day PRN Moderate Pain (4 - 6)      Allergies    No Known Allergies    Vital Signs Last 24 Hrs  T(C): 36.7 (26 Apr 2018 07:30), Max: 37.1 (25 Apr 2018 16:04)  T(F): 98.1 (26 Apr 2018 07:30), Max: 98.7 (25 Apr 2018 16:04)  HR: 79 (26 Apr 2018 07:30) (66 - 79)  BP: 128/73 (26 Apr 2018 07:30) (78/49 - 128/73)  BP(mean): --  RR: 18 (26 Apr 2018 07:30) (18 - 18)  SpO2: 91% (26 Apr 2018 07:30) (91% - 98%)    PHYSICAL EXAM:    GENERAL: NAD  CHEST/LUNG: ctab  HEART: Regular rate and rhythm; S1 S2  ABDOMEN: Soft, Nondistended; Bowel sounds present  EXTREMITIES:  no edema  : rt cva TTP  NERVOUS SYSTEM:  Alert & Oriented X3 nonfocal  PSYCH: normal mood, appropriate response.    LABS:                        10.4   7.1   )-----------( 175      ( 26 Apr 2018 07:58 )             32.1     04-26    142  |  107  |  7.0<L>  ----------------------------<  143<H>  4.3   |  24.0  |  0.42<L>    Ca    8.3<L>      26 Apr 2018 07:58  Mg     2.1     04-25            CAPILLARY BLOOD GLUCOSE      POCT Blood Glucose.: 155 mg/dL (26 Apr 2018 07:31)  POCT Blood Glucose.: 265 mg/dL (25 Apr 2018 21:29)  POCT Blood Glucose.: 161 mg/dL (25 Apr 2018 16:16)  POCT Blood Glucose.: 221 mg/dL (25 Apr 2018 12:13)        RADIOLOGY & ADDITIONAL TESTS:
seen for pyelonephritis.    complaining of right flank pain, frontal headache.  no dysuria.  ros otherwise negative     MEDICATIONS  (STANDING):  aspirin  chewable 81 milliGRAM(s) Oral daily  cefTRIAXone Injectable.      cholecalciferol 1000 Unit(s) Oral daily  enoxaparin Injectable 40 milliGRAM(s) SubCutaneous daily  FLUoxetine 50 milliGRAM(s) Oral daily  gabapentin 600 milliGRAM(s) Oral three times a day  insulin glargine Injectable (LANTUS) 10 Unit(s) SubCutaneous at bedtime  insulin lispro (HumaLOG) corrective regimen sliding scale   SubCutaneous Before meals and at bedtime  lisinopril 2.5 milliGRAM(s) Oral daily  QUEtiapine 150 milliGRAM(s) Oral daily  saccharomyces boulardii 250 milliGRAM(s) Oral two times a day  simvastatin 20 milliGRAM(s) Oral at bedtime  sodium chloride 0.9%. 1000 milliLiter(s) (125 mL/Hr) IV Continuous <Continuous>    MEDICATIONS  (PRN):  acetaminophen   Tablet 650 milliGRAM(s) Oral every 6 hours PRN For Temp greater than 38 C (100.4 F)  acetaminophen   Tablet. 650 milliGRAM(s) Oral every 6 hours PRN Mild Pain (1 - 3)  ketorolac   Injectable 30 milliGRAM(s) IV Push every 6 hours PRN severe/ breakthrough pain  ondansetron Injectable 4 milliGRAM(s) IV Push every 6 hours PRN Nausea and/or Vomiting  oxyCODONE    5 mG/acetaminophen 325 mG 1 Tablet(s) Oral every 4 hours PRN Moderate Pain (4 - 6)      Allergies    No Known Allergies      Vital Signs Last 24 Hrs  T(C): 36.8 (2018 09:01), Max: 39.3 (2018 21:21)  T(F): 98.3 (2018 09:01), Max: 102.8 (2018 21:21)  HR: 79 (2018 09:01) (68 - 130)  BP: 96/60 (2018 09:01) (90/55 - 119/72)  BP(mean): --  RR: 18 (2018 09:01) (18 - 20)  SpO2: 95% (2018 09:01) (94% - 99%)    PHYSICAL EXAM:    GENERAL: mild distress 2/2 pain  CHEST/LUNG: Clear to percussion bilaterally  HEART: Regular rate and rhythm; S1 S2; no murmurs noted  ABDOMEN: Soft, Nontender, Nondistended; Bowel sounds present  EXTREMITIES: no edema  : Rt CVA TTP.   NERVOUS SYSTEM:  Alert & Oriented X3,nonfocal  PSYCH: normal mood, appropriate response.    LABS:                        11.6   10.9  )-----------( 181      ( 2018 08:13 )             35.3     04-    141  |  104  |  8.0  ----------------------------<  151<H>  3.9   |  27.0  |  0.54    Ca    8.2<L>      2018 08:13  Mg     1.5         TPro  7.2  /  Alb  4.4  /  TBili  0.3<L>  /  DBili  x   /  AST  14  /  ALT  15  /  AlkPhos  50  -    PT/INR - ( 2018 21:05 )   PT: 10.1 sec;   INR: 0.92 ratio         PTT - ( 2018 21:05 )  PTT:30.1 sec  Urinalysis Basic - ( 2018 23:18 )    Color: Yellow / Appearance: Clear / S.005 / pH: x  Gluc: x / Ketone: Negative  / Bili: Negative / Urobili: Negative mg/dL   Blood: x / Protein: Negative mg/dL / Nitrite: Negative   Leuk Esterase: Trace / RBC: 0-2 /HPF / WBC 3-5   Sq Epi: x / Non Sq Epi: Few / Bacteria: x        CAPILLARY BLOOD GLUCOSE      POCT Blood Glucose.: 193 mg/dL (2018 11:36)  POCT Blood Glucose.: 158 mg/dL (2018 08:06)        RADIOLOGY & ADDITIONAL TESTS:

## 2018-04-27 NOTE — DISCHARGE NOTE ADULT - PLAN OF CARE
resolution and prevention finish course of antibiotics  follow up with Dr Arriaga for further management as should be on chronic suppressive antibiotics. see above   resolved cessation advised.  inhalers provided.  follow up with pulmonology in 2-3 weeks. hemoglobin a1c 9.9  poor control  frequent glucose monitoring.  please use 70/30 insulin twice daily at 15 Units.  follow up with endocrinology in 1 week.

## 2018-04-27 NOTE — PROGRESS NOTE ADULT - PROBLEM SELECTOR PROBLEM 1
Pyelonephritis
Sepsis, due to unspecified organism

## 2018-04-27 NOTE — DISCHARGE NOTE ADULT - MEDICATION SUMMARY - MEDICATIONS TO STOP TAKING
I will STOP taking the medications listed below when I get home from the hospital:    hydrOXYzine hydrochloride 25 mg oral tablet  -- 1 tab(s) by mouth every 6 hours  -- May cause drowsiness.  Alcohol may intensify this effect.  Use care when operating dangerous machinery.  Obtain medical advice before taking any non-prescription drugs as some may affect the action of this medication.    Pepcid 20 mg oral tablet  -- 1 tab(s) by mouth 2 times a day  -- It is very important that you take or use this exactly as directed.  Do not skip doses or discontinue unless directed by your doctor.  Obtain medical advice before taking any non-prescription drugs as some may affect the action of this medication.    glipiZIDE 10 mg oral tablet  -- 1 tab(s) by mouth 2 times a day    Valtrex 500 mg oral tablet  -- 3 tab(s) by mouth 3 times a day  -- It is very important that you take or use this exactly as directed.  Do not skip doses or discontinue unless directed by your doctor.    predniSONE 50 mg oral tablet  -- 1 tab(s) by mouth once a day  -- It is very important that you take or use this exactly as directed.  Do not skip doses or discontinue unless directed by your doctor.  Obtain medical advice before taking any non-prescription drugs as some may affect the action of this medication.  Take with food or milk.    EpiPen 2-Ponce  -- 1 application intramuscular once a day take as needed fro severe allergic reaction    Pepcid 20 mg oral tablet  -- 1 tab(s) by mouth 2 times a day  -- It is very important that you take or use this exactly as directed.  Do not skip doses or discontinue unless directed by your doctor.  Obtain medical advice before taking any non-prescription drugs as some may affect the action of this medication.    ondansetron 4 mg oral tablet, disintegrating  -- 1 tab(s) by mouth every 8 hours    Naprosyn 500 mg oral tablet  -- 1 tab(s) by mouth 2 times a day  -- Check with your doctor before becoming pregnant.  May cause drowsiness or dizziness.  Obtain medical advice before taking any non-prescription drugs as some may affect the action of this medication.  Take with food or milk.    Bactrim  mg-160 mg oral tablet  -- 1 tab(s) by mouth 2 times a day   -- Avoid prolonged or excessive exposure to direct and/or artificial sunlight while taking this medication.  Finish all this medication unless otherwise directed by prescriber.  Medication should be taken with plenty of water.    Keflex 500 mg oral capsule  -- 1 cap(s) by mouth 4 times a day   -- Finish all this medication unless otherwise directed by prescriber.

## 2018-04-27 NOTE — PROGRESS NOTE ADULT - PROBLEM SELECTOR PLAN 1
CHANGE ANTIBIOTICS TO LEVAQUIN x   7 more days to complete course of treatment for her Klebsiella infection.
IVF, ceftriaxone  f/u blood/urine culture  previous ua with sensitive klebsiella
IVF, ceftriaxone  f/u blood/urine culture  previous ua with sensitive klebsiella
IVF, ceftriaxone  neg blood/urine culture  previous ucx with sensitive klebsiella

## 2018-04-29 LAB
CULTURE RESULTS: SIGNIFICANT CHANGE UP
CULTURE RESULTS: SIGNIFICANT CHANGE UP
SPECIMEN SOURCE: SIGNIFICANT CHANGE UP
SPECIMEN SOURCE: SIGNIFICANT CHANGE UP

## 2018-04-30 LAB
CULTURE RESULTS: SIGNIFICANT CHANGE UP
SPECIMEN SOURCE: SIGNIFICANT CHANGE UP

## 2018-05-23 PROCEDURE — 80053 COMPREHEN METABOLIC PANEL: CPT

## 2018-05-23 PROCEDURE — 36415 COLL VENOUS BLD VENIPUNCTURE: CPT

## 2018-05-23 PROCEDURE — 83036 HEMOGLOBIN GLYCOSYLATED A1C: CPT

## 2018-05-23 PROCEDURE — 93005 ELECTROCARDIOGRAM TRACING: CPT

## 2018-05-23 PROCEDURE — 83605 ASSAY OF LACTIC ACID: CPT

## 2018-05-23 PROCEDURE — 74177 CT ABD & PELVIS W/CONTRAST: CPT

## 2018-05-23 PROCEDURE — 85027 COMPLETE CBC AUTOMATED: CPT

## 2018-05-23 PROCEDURE — 82962 GLUCOSE BLOOD TEST: CPT

## 2018-05-23 PROCEDURE — 83735 ASSAY OF MAGNESIUM: CPT

## 2018-05-23 PROCEDURE — 94640 AIRWAY INHALATION TREATMENT: CPT

## 2018-05-23 PROCEDURE — 87086 URINE CULTURE/COLONY COUNT: CPT

## 2018-05-23 PROCEDURE — 80048 BASIC METABOLIC PNL TOTAL CA: CPT

## 2018-05-23 PROCEDURE — 87040 BLOOD CULTURE FOR BACTERIA: CPT

## 2018-05-23 PROCEDURE — 81001 URINALYSIS AUTO W/SCOPE: CPT

## 2018-06-01 ENCOUNTER — APPOINTMENT (OUTPATIENT)
Dept: PULMONOLOGY | Facility: CLINIC | Age: 58
End: 2018-06-01

## 2018-06-01 ENCOUNTER — APPOINTMENT (OUTPATIENT)
Dept: ORTHOPEDIC SURGERY | Facility: CLINIC | Age: 58
End: 2018-06-01
Payer: MEDICAID

## 2018-06-01 VITALS
HEART RATE: 70 BPM | HEIGHT: 63 IN | DIASTOLIC BLOOD PRESSURE: 73 MMHG | WEIGHT: 150 LBS | SYSTOLIC BLOOD PRESSURE: 105 MMHG | BODY MASS INDEX: 26.58 KG/M2

## 2018-06-01 DIAGNOSIS — M16.7 OTHER UNILATERAL SECONDARY OSTEOARTHRITIS OF HIP: ICD-10-CM

## 2018-06-01 PROBLEM — M47.816 LUMBAR SPONDYLOSIS: Status: ACTIVE | Noted: 2018-06-01

## 2018-06-01 PROCEDURE — 96372 THER/PROPH/DIAG INJ SC/IM: CPT

## 2018-06-01 PROCEDURE — 99214 OFFICE O/P EST MOD 30 MIN: CPT | Mod: 25

## 2018-06-01 PROCEDURE — 72170 X-RAY EXAM OF PELVIS: CPT

## 2018-06-01 RX ORDER — ALCOHOL ANTISEPTIC PADS
PADS, MEDICATED (EA) TOPICAL
Qty: 100 | Refills: 0 | Status: ACTIVE | COMMUNITY
Start: 2017-11-30

## 2018-06-01 RX ORDER — NEBULIZER AND COMPRESSOR
EACH MISCELLANEOUS
Qty: 1 | Refills: 0 | Status: ACTIVE | COMMUNITY
Start: 2018-04-27

## 2018-06-01 RX ORDER — CEPHALEXIN 500 MG/1
500 CAPSULE ORAL
Qty: 28 | Refills: 0 | Status: ACTIVE | COMMUNITY
Start: 2018-05-02

## 2018-06-01 RX ORDER — ALBUTEROL SULFATE 1.25 MG/3ML
1.25 SOLUTION RESPIRATORY (INHALATION)
Qty: 150 | Refills: 0 | Status: ACTIVE | COMMUNITY
Start: 2018-04-27

## 2018-06-01 RX ORDER — BLOOD-GLUCOSE METER
KIT MISCELLANEOUS
Qty: 1 | Refills: 0 | Status: ACTIVE | COMMUNITY
Start: 2017-11-30

## 2018-06-01 RX ORDER — SUMATRIPTAN 50 MG/1
50 TABLET, FILM COATED ORAL
Qty: 9 | Refills: 0 | Status: ACTIVE | COMMUNITY
Start: 2018-05-17

## 2018-06-01 RX ORDER — FLUOXETINE HYDROCHLORIDE 10 MG/1
10 CAPSULE ORAL
Qty: 30 | Refills: 0 | Status: ACTIVE | COMMUNITY
Start: 2018-05-02

## 2018-06-01 RX ORDER — ISOPROPYL ALCOHOL 0.7 ML/ML
70 SWAB TOPICAL
Qty: 100 | Refills: 0 | Status: ACTIVE | COMMUNITY
Start: 2017-11-30

## 2018-06-01 RX ORDER — PEN NEEDLE, DIABETIC 32GX 5/32"
32G X 6 MM NEEDLE, DISPOSABLE MISCELLANEOUS
Qty: 100 | Refills: 0 | Status: ACTIVE | COMMUNITY
Start: 2017-11-30

## 2018-06-01 RX ORDER — KETOROLAC TROMETHAMINE 10 MG/1
10 TABLET, FILM COATED ORAL
Qty: 20 | Refills: 0 | Status: ACTIVE | COMMUNITY
Start: 2018-06-01 | End: 1900-01-01

## 2018-06-01 RX ORDER — LEVOFLOXACIN 750 MG/1
750 TABLET, FILM COATED ORAL
Qty: 7 | Refills: 0 | Status: ACTIVE | COMMUNITY
Start: 2018-04-27

## 2018-06-01 RX ORDER — QUETIAPINE FUMARATE 300 MG/1
300 TABLET ORAL
Qty: 30 | Refills: 0 | Status: ACTIVE | COMMUNITY
Start: 2018-05-10

## 2018-06-01 RX ORDER — BLOOD SUGAR DIAGNOSTIC
STRIP MISCELLANEOUS
Qty: 50 | Refills: 0 | Status: ACTIVE | COMMUNITY
Start: 2017-11-30

## 2018-06-01 RX ORDER — INSULIN GLARGINE 100 [IU]/ML
100 INJECTION, SOLUTION SUBCUTANEOUS
Qty: 15 | Refills: 0 | Status: ACTIVE | COMMUNITY
Start: 2018-05-04

## 2018-06-01 RX ORDER — LISINOPRIL 2.5 MG/1
2.5 TABLET ORAL
Qty: 30 | Refills: 0 | Status: ACTIVE | COMMUNITY
Start: 2018-05-02

## 2018-06-01 RX ORDER — ALBUTEROL SULFATE 90 UG/1
108 (90 BASE) AEROSOL, METERED RESPIRATORY (INHALATION)
Qty: 18 | Refills: 0 | Status: ACTIVE | COMMUNITY
Start: 2018-04-11

## 2018-06-01 RX ORDER — QUETIAPINE FUMARATE 200 MG/1
200 TABLET ORAL
Qty: 30 | Refills: 0 | Status: ACTIVE | COMMUNITY
Start: 2018-04-19

## 2018-06-01 RX ORDER — FLUOXETINE HYDROCHLORIDE 40 MG/1
40 CAPSULE ORAL
Qty: 30 | Refills: 0 | Status: ACTIVE | COMMUNITY
Start: 2018-05-02

## 2018-06-01 RX ORDER — BLOOD SUGAR DIAGNOSTIC
STRIP MISCELLANEOUS
Qty: 100 | Refills: 0 | Status: ACTIVE | COMMUNITY
Start: 2017-11-30

## 2018-06-01 RX ORDER — OXYCODONE AND ACETAMINOPHEN 5; 325 MG/1; MG/1
5-325 TABLET ORAL
Qty: 30 | Refills: 0 | Status: ACTIVE | COMMUNITY
Start: 2018-05-02

## 2018-06-01 RX ORDER — LANCETS
EACH MISCELLANEOUS
Qty: 1 | Refills: 0 | Status: ACTIVE | COMMUNITY
Start: 2017-11-30

## 2018-06-01 NOTE — ED ADULT TRIAGE NOTE - STATUS:
After Visit Summary   6/1/2018    Noelle Vasquez    MRN: [id number]           Patient Information     Date Of Birth          2001        Visit Information        Provider Department      6/1/2018 11:30 AM Annalisa Veloz PA [facility]        Today's Diagnoses     Acute swimmer's ear of left side    -  1      Care Instructions    Swimmers ear on left  Water precautions, do not submerge head in water for next week  Ibuprofen or tylenol as needed for discomfort  Cortisporin 3 drops to affected ear 4 times daily for 7 days  Follow up with PCP if symptoms persist or worsen  Seek immediate care for worsening    Repeated temperature of 104 F (40 C) or higher, or as directed by the provider    Fever that lasts more than 24 hours in a child under 2 years old. Or a fever that lasts for 3 days in a child 2 years or older.    When Your Child Has  Swimmer s Ear    If your child spends a lot of time in the water and is having ear pain, he or she may have developed swimmer's ear (otitis externa). It is a skin infection that happens in the ear canal, between the opening of the ear and the eardrum. When the ear canal becomes too moist, bacteria can grow. This causes pain, swelling, and redness in the ear canal.  Who is at risk for swimmer s ear?  Children are more likely to get swimmer s ear if they:    Swim or lie down in a bathtub or hot tub    Clean their ear canals roughly. This causes tiny cuts or scratches that easily get infected.    Have ear canals that are naturally narrow    Have excess earwax that traps fluid in the ear canal  What are the symptoms of swimmer s ear?   The most common symptoms of swimmer s ear are:    Ear pain, especially when pulling on the earlobe or when chewing    Redness or swelling in the ear canal or near the ear    Itching in the ear    Drainage from the ear    Feeling like water is in the ear    Fever    Problems hearing  How is swimmer s ear  diagnosed?  The healthcare provider will examine your child. He or she will also ask questions to help rule out other causes of ear pain. The healthcare provider will look for:    Redness and swelling in the ear canal    Drainage from the ear canal    Pain when moving the earlobe  How is swimmer s ear treated?  To treat your child s ear, the healthcare provider may recommend:    Medicines such as antibiotic ear drops or a pain reliever that is put in the ear. Antibiotic medicine taken by mouth (orally) is not recommended.    Over-the-counter pain relievers such as acetaminophen and ibuprofen. Don't give ibuprofen to infants younger than 6 months of age or to children who are dehydrated or constantly vomiting. Don t give your child aspirin to relieve a fever. Using aspirin to treat a fever in children could cause a serious condition called Reye syndrome.  How can you prevent swimmer s ear?  Ask your child's healthcare provider about using the following to help prevent swimmer s ear:    After your child has been in the water, have your child tilt his or her head to each side to help any water drain out. You can also dry his or her ear canal using a blow dryer. Use a low air and cool setting. Hold the dryer at least 12 inches from your child s head. Wave the dryer slowly back and forth--don t hold it still. You may also gently pull the earlobe down and slightly backward to allow the air to reach the ear canal.    Use a tissue to gently draw water out of the ear. Your child s healthcare provider can show you how.    Use over-the-counter ear drops if the healthcare provider suggests this. These help dry out the inside of your child s ear. Smaller children may need to lie down on a couch or bed for a short time to keep the drops inside the ear canal.    Gently clean your child s ear canal. Don't use cotton swabs.  When to call your child s healthcare provider  Call your child's healthcare provider if your child has any of  the following:    Increased pain redness, or swelling of the outer ear    Ear pain, redness, or swelling that does not go away with treatment    Fever (see Fever and children, below)     Fever and children  Always use a digital thermometer to check your child s temperature. Never use a mercury thermometer.  For infants and toddlers, be sure to use a rectal thermometer correctly. A rectal thermometer may accidentally poke a hole in (perforate) the rectum. It may also pass on germs from the stool. Always follow the product maker s directions for proper use. If you don t feel comfortable taking a rectal temperature, use another method. When you talk to your child s healthcare provider, tell him or her which method you used to take your child s temperature.  Here are guidelines for fever temperature. Ear temperatures aren t accurate before 6 months of age. Don t take an oral temperature until your child is at least 4 years old.  Infant under 3 months old:    Ask your child s healthcare provider how you should take the temperature.    Rectal or forehead (temporal artery) temperature of 100.4 F (38 C) or higher, or as directed by the provider    Armpit temperature of 99 F (37.2 C) or higher, or as directed by the provider  Child age 3 to 36 months:    Rectal, forehead (temporal artery), or ear temperature of 102 F (38.9 C) or higher, or as directed by the provider    Armpit temperature of 101 F (38.3 C) or higher, or as directed by the provider  Child of any age:    Repeated temperature of 104 F (40 C) or higher, or as directed by the provider    Fever that lasts more than 24 hours in a child under 2 years old. Or a fever that lasts for 3 days in a child 2 years or older.   Date Last Reviewed: 11/1/2016 2000-2017 [employer]. [address] [place] [address], [place] [address]. All rights reserved. This information is not intended as a substitute for professional medical care. Always follow your healthcare  "professional's instructions.                Follow-ups after your visit        Who to contact     If you have questions or need follow up information about today's clinic visit or your schedule please contact [facility] directly at [phone].  Normal or non-critical lab and imaging results will be communicated to you by Topmissionhart, letter or phone within 4 business days after the clinic has received the results. If you do not hear from us within 7 days, please contact the clinic through Topmissionhart or phone. If you have a critical or abnormal lab result, we will notify you by phone as soon as possible.  Submit refill requests through iRewind or call your pharmacy and they will forward the refill request to us. Please allow 3 business days for your refill to be completed.          Additional Information About Your Visit        Topmissionhart Information     iRewind lets you send messages to your doctor, view your test results, renew your prescriptions, schedule appointments and more. To sign up, go to [email or web address], contact your [facility] clinic or call [phone] during business hours.            Care EveryWhere ID     This is your Care EveryWhere ID. This could be used by other organizations to access your [facility] medical records  [id number]        Your Vitals Were     Pulse Temperature Respirations Height Breastfeeding? BMI (Body Mass Index)    73 98.4  F (36.9  C) (Tympanic) 16 5' 2.01\" (1.575 m) No 19.27 kg/m2       Blood Pressure from Last 3 Encounters:   04/05/16 98/62   12/01/14 100/40   08/11/14 90/50    Weight from Last 3 Encounters:   06/01/18 105 lb 6.4 oz (47.8 kg) (25 %)*   04/05/16 91 lb (41.3 kg) (23 %)*   12/15/15 87 lb (39.5 kg) (20 %)*     * Growth percentiles are based on CDC 2-20 Years data.              Today, you had the following     No orders found for display         Today's Medication Changes          These changes are accurate as of 6/1/18 11:48 AM.  If you have " any questions, ask your nurse or doctor.               Start taking these medicines.        Dose/Directions    neomycin-polymyxin-hydrocortisone 3.5-01199-4 otic suspension   Commonly known as:  CORTISPORIN   Used for:  Acute swimmer's ear of left side   Started by:  Annalisa Veloz PA        Dose:  3 drop   Place 3 drops Into the left ear 4 times daily   Quantity:  1 Bottle   Refills:  0            Where to get your medicines      These medications were sent to [facility] - [place], [place] - [address]  [address] [place], [facility] [address]     Phone:  [phone]     neomycin-polymyxin-hydrocortisone 3.5-29051-7 otic suspension                Primary Care Provider Office Phone # Fax #    Geovanna Azul [phone]       [address]  [facility] [address]        Equal Access to Services     [facility]: Hadii tommy brothers hadasho Soomaali, waaxda luqadaha, qaybta kaalmada adeegyada, anel clemens haybroderick villanueva . So [facility] [phone].    ATENCIÓN: Si habla español, tiene a doll disposición servicios gratuitos de asistencia lingüística. Liz al [phone].    We comply with applicable federal civil rights laws and [place] laws. We do not discriminate on the basis of race, color, national origin, age, disability, sex, sexual orientation, or gender identity.            Thank you!     Thank you for choosing [facility]  for your care. Our goal is always to provide you with excellent care. Hearing back from our patients is one way we can continue to improve our services. Please take a few minutes to complete the written survey that you may receive in the mail after your visit with us. Thank you!             Your Updated Medication List - Protect others around you: Learn how to safely use, store and throw away your medicines at www.disposemymeds.org.          This list is accurate as of 6/1/18 11:48 AM.  Always use your most  Intact recent med list.                   Brand Name Dispense Instructions for use Diagnosis    * atomoxetine 10 MG capsule    STRATTERA          * atomoxetine 25 MG capsule    STRATTERA     Take 25 mg by mouth daily        * atomoxetine 40 MG capsule    STRATTERA     Take 40 mg by mouth daily        neomycin-polymyxin-hydrocortisone 3.5-44738-3 otic suspension    CORTISPORIN    1 Bottle    Place 3 drops Into the left ear 4 times daily    Acute swimmer's ear of left side       * Notice:  This list has 3 medication(s) that are the same as other medications prescribed for you. Read the directions carefully, and ask your doctor or other care provider to review them with you.

## 2018-06-03 ENCOUNTER — EMERGENCY (EMERGENCY)
Facility: HOSPITAL | Age: 58
LOS: 1 days | Discharge: HOME | End: 2018-06-03
Attending: EMERGENCY MEDICINE
Payer: MEDICAID

## 2018-06-03 VITALS
OXYGEN SATURATION: 98 % | TEMPERATURE: 98 F | RESPIRATION RATE: 16 BRPM | HEART RATE: 78 BPM | SYSTOLIC BLOOD PRESSURE: 135 MMHG | DIASTOLIC BLOOD PRESSURE: 67 MMHG

## 2018-06-03 VITALS — WEIGHT: 149.91 LBS | HEIGHT: 63 IN

## 2018-06-03 DIAGNOSIS — Z98.89 OTHER SPECIFIED POSTPROCEDURAL STATES: Chronic | ICD-10-CM

## 2018-06-03 DIAGNOSIS — Z87.828 PERSONAL HISTORY OF OTHER (HEALED) PHYSICAL INJURY AND TRAUMA: Chronic | ICD-10-CM

## 2018-06-03 DIAGNOSIS — Z90.710 ACQUIRED ABSENCE OF BOTH CERVIX AND UTERUS: Chronic | ICD-10-CM

## 2018-06-03 PROCEDURE — 72100 X-RAY EXAM L-S SPINE 2/3 VWS: CPT

## 2018-06-03 PROCEDURE — 99283 EMERGENCY DEPT VISIT LOW MDM: CPT

## 2018-06-03 PROCEDURE — 72100 X-RAY EXAM L-S SPINE 2/3 VWS: CPT | Mod: 26

## 2018-06-03 PROCEDURE — 99284 EMERGENCY DEPT VISIT MOD MDM: CPT | Mod: 25

## 2018-06-03 PROCEDURE — 73502 X-RAY EXAM HIP UNI 2-3 VIEWS: CPT

## 2018-06-03 PROCEDURE — 73502 X-RAY EXAM HIP UNI 2-3 VIEWS: CPT | Mod: 26,RT

## 2018-06-03 PROCEDURE — 96372 THER/PROPH/DIAG INJ SC/IM: CPT

## 2018-06-03 RX ORDER — DEXAMETHASONE 0.5 MG/5ML
10 ELIXIR ORAL ONCE
Qty: 0 | Refills: 0 | Status: COMPLETED | OUTPATIENT
Start: 2018-06-03 | End: 2018-06-03

## 2018-06-03 RX ORDER — OXYCODONE AND ACETAMINOPHEN 5; 325 MG/1; MG/1
2 TABLET ORAL ONCE
Qty: 0 | Refills: 0 | Status: DISCONTINUED | OUTPATIENT
Start: 2018-06-03 | End: 2018-06-03

## 2018-06-03 RX ADMIN — OXYCODONE AND ACETAMINOPHEN 2 TABLET(S): 5; 325 TABLET ORAL at 22:03

## 2018-06-03 RX ADMIN — Medication 10 MILLIGRAM(S): at 22:03

## 2018-06-03 NOTE — ED PROVIDER NOTE - NEUROLOGICAL, MLM
Alert and oriented, no focal deficits, no motor or sensory deficits. Strength 5/5 in lower extremities

## 2018-06-03 NOTE — ED PROVIDER NOTE - PROGRESS NOTE DETAILS
Case s/o to Dr Grijalva to check the imaging and re-evaluate and most likely DC Pt. re-evaluated. x-ray interpretation discussed with pt. Pt. has a follow up this week with ortho and physical therapy. Pt. stable for discharge.
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
## 2018-06-03 NOTE — ED PROVIDER NOTE - MEDICAL DECISION MAKING DETAILS
56 y/o F pt with worsening R hip pain, concern for sciatica vs. arthritis, give pain medications and discharge to follow out patient with ortho

## 2018-06-03 NOTE — ED PROVIDER NOTE - OBJECTIVE STATEMENT
58 y/o F pt with hx of herniated discs in lumbar spine HLD, HTN, DM, bipolar disorder, depression, anemia and hysterectomy presents to ED c/o R hip pain that began years ago but has worsened in the last 3-4 days. Pt states she saw her orthopedist 3 days ago and had a X-ray done that showed arthritis and decreased cartilage. Pt was given Toradol in the office with no relief. She also notes mild numbness behind her R thigh. Denies fall, CP, SOB, nausea, vomiting, weakness, and motor/sensory loss. No further complaints at this time.   Ortho: Ban

## 2018-06-07 ENCOUNTER — APPOINTMENT (OUTPATIENT)
Dept: GASTROENTEROLOGY | Facility: CLINIC | Age: 58
End: 2018-06-07

## 2018-06-07 DIAGNOSIS — M47.816 SPONDYLOSIS W/OUT MYELOPATHY OR RADICULOPATHY, LUMBAR REGION: ICD-10-CM

## 2018-06-26 ENCOUNTER — APPOINTMENT (OUTPATIENT)
Dept: ORTHOPEDIC SURGERY | Facility: CLINIC | Age: 58
End: 2018-06-26
Payer: MEDICAID

## 2018-06-26 VITALS
SYSTOLIC BLOOD PRESSURE: 92 MMHG | BODY MASS INDEX: 26.58 KG/M2 | HEART RATE: 68 BPM | WEIGHT: 150 LBS | DIASTOLIC BLOOD PRESSURE: 63 MMHG | HEIGHT: 63 IN

## 2018-06-26 PROCEDURE — 99214 OFFICE O/P EST MOD 30 MIN: CPT

## 2018-06-29 ENCOUNTER — APPOINTMENT (OUTPATIENT)
Dept: ORTHOPEDIC SURGERY | Facility: CLINIC | Age: 58
End: 2018-06-29

## 2018-07-29 ENCOUNTER — EMERGENCY (EMERGENCY)
Facility: HOSPITAL | Age: 58
LOS: 1 days | Discharge: HOME | End: 2018-07-29
Attending: EMERGENCY MEDICINE
Payer: MEDICAID

## 2018-07-29 VITALS — WEIGHT: 151.02 LBS | HEIGHT: 63 IN

## 2018-07-29 VITALS
OXYGEN SATURATION: 97 % | RESPIRATION RATE: 17 BRPM | TEMPERATURE: 98 F | SYSTOLIC BLOOD PRESSURE: 97 MMHG | HEART RATE: 77 BPM | DIASTOLIC BLOOD PRESSURE: 65 MMHG

## 2018-07-29 DIAGNOSIS — Z87.828 PERSONAL HISTORY OF OTHER (HEALED) PHYSICAL INJURY AND TRAUMA: Chronic | ICD-10-CM

## 2018-07-29 DIAGNOSIS — Z90.710 ACQUIRED ABSENCE OF BOTH CERVIX AND UTERUS: Chronic | ICD-10-CM

## 2018-07-29 DIAGNOSIS — Z98.89 OTHER SPECIFIED POSTPROCEDURAL STATES: Chronic | ICD-10-CM

## 2018-07-29 LAB
ACETONE SERPL-MCNC: ABNORMAL
ALBUMIN SERPL ELPH-MCNC: 4.3 G/DL — SIGNIFICANT CHANGE UP (ref 3.3–5.2)
ALP SERPL-CCNC: 77 U/L — SIGNIFICANT CHANGE UP (ref 40–120)
ALT FLD-CCNC: 7 U/L — SIGNIFICANT CHANGE UP
ANION GAP SERPL CALC-SCNC: 18 MMOL/L — HIGH (ref 5–17)
APPEARANCE UR: CLEAR — SIGNIFICANT CHANGE UP
APTT BLD: 29 SEC — SIGNIFICANT CHANGE UP (ref 27.5–37.4)
AST SERPL-CCNC: 7 U/L — SIGNIFICANT CHANGE UP
BASE EXCESS BLDV CALC-SCNC: -2.7 MMOL/L — LOW (ref -2–2)
BASOPHILS NFR BLD AUTO: 1 % — SIGNIFICANT CHANGE UP (ref 0–2)
BILIRUB SERPL-MCNC: 0.3 MG/DL — LOW (ref 0.4–2)
BILIRUB UR-MCNC: NEGATIVE — SIGNIFICANT CHANGE UP
BLD GP AB SCN SERPL QL: SIGNIFICANT CHANGE UP
BUN SERPL-MCNC: 19 MG/DL — SIGNIFICANT CHANGE UP (ref 8–20)
CA-I SERPL-SCNC: 1.07 MMOL/L — LOW (ref 1.15–1.33)
CALCIUM SERPL-MCNC: 9.9 MG/DL — SIGNIFICANT CHANGE UP (ref 8.6–10.2)
CHLORIDE BLDV-SCNC: 100 MMOL/L — SIGNIFICANT CHANGE UP (ref 98–107)
CHLORIDE SERPL-SCNC: 90 MMOL/L — LOW (ref 98–107)
CO2 SERPL-SCNC: 22 MMOL/L — SIGNIFICANT CHANGE UP (ref 22–29)
COLOR SPEC: YELLOW — SIGNIFICANT CHANGE UP
CREAT SERPL-MCNC: 1.03 MG/DL — SIGNIFICANT CHANGE UP (ref 0.5–1.3)
DIFF PNL FLD: ABNORMAL
EOSINOPHIL NFR BLD AUTO: 1 % — SIGNIFICANT CHANGE UP (ref 0–5)
EPI CELLS # UR: SIGNIFICANT CHANGE UP
GAS PNL BLDV: 133 MMOL/L — LOW (ref 135–145)
GAS PNL BLDV: SIGNIFICANT CHANGE UP
GAS PNL BLDV: SIGNIFICANT CHANGE UP
GLUCOSE BLDV-MCNC: 467 MG/DL — CRITICAL HIGH (ref 70–99)
GLUCOSE SERPL-MCNC: 510 MG/DL — CRITICAL HIGH (ref 70–115)
GLUCOSE UR QL: 1000 MG/DL
HCG UR QL: NEGATIVE — SIGNIFICANT CHANGE UP
HCO3 BLDV-SCNC: 22 MMOL/L — SIGNIFICANT CHANGE UP (ref 20–26)
HCT VFR BLD CALC: 40.4 % — SIGNIFICANT CHANGE UP (ref 37–47)
HCT VFR BLDA CALC: 41 — SIGNIFICANT CHANGE UP (ref 39–50)
HGB BLD CALC-MCNC: 13.4 G/DL — SIGNIFICANT CHANGE UP (ref 11.5–15.5)
HGB BLD-MCNC: 13.6 G/DL — SIGNIFICANT CHANGE UP (ref 12–16)
INR BLD: 1.2 RATIO — HIGH (ref 0.88–1.16)
KETONES UR-MCNC: ABNORMAL
LACTATE BLDV-MCNC: 1.5 MMOL/L — SIGNIFICANT CHANGE UP (ref 0.5–2)
LACTATE BLDV-MCNC: 1.8 MMOL/L — SIGNIFICANT CHANGE UP (ref 0.5–2)
LEUKOCYTE ESTERASE UR-ACNC: NEGATIVE — SIGNIFICANT CHANGE UP
LIDOCAIN IGE QN: 16 U/L — LOW (ref 22–51)
LYMPHOCYTES # BLD AUTO: 7 % — LOW (ref 20–55)
MCHC RBC-ENTMCNC: 28.7 PG — SIGNIFICANT CHANGE UP (ref 27–31)
MCHC RBC-ENTMCNC: 33.7 G/DL — SIGNIFICANT CHANGE UP (ref 32–36)
MCV RBC AUTO: 85.2 FL — SIGNIFICANT CHANGE UP (ref 81–99)
MONOCYTES NFR BLD AUTO: 3 % — SIGNIFICANT CHANGE UP (ref 3–10)
NEUTROPHILS NFR BLD AUTO: 86 % — HIGH (ref 37–73)
NEUTS BAND # BLD: 2 % — SIGNIFICANT CHANGE UP (ref 0–8)
NITRITE UR-MCNC: NEGATIVE — SIGNIFICANT CHANGE UP
OTHER CELLS CSF MANUAL: 16 ML/DL — LOW (ref 18–22)
PCO2 BLDV: 34 MMHG — LOW (ref 35–50)
PH BLDV: 7.41 — SIGNIFICANT CHANGE UP (ref 7.32–7.43)
PH UR: 6.5 — SIGNIFICANT CHANGE UP (ref 5–8)
PLAT MORPH BLD: NORMAL — SIGNIFICANT CHANGE UP
PLATELET # BLD AUTO: 324 K/UL — SIGNIFICANT CHANGE UP (ref 150–400)
PO2 BLDV: 58 MMHG — HIGH (ref 25–45)
POTASSIUM BLDV-SCNC: 4.1 MMOL/L — SIGNIFICANT CHANGE UP (ref 3.4–4.5)
POTASSIUM SERPL-MCNC: 4 MMOL/L — SIGNIFICANT CHANGE UP (ref 3.5–5.3)
POTASSIUM SERPL-SCNC: 4 MMOL/L — SIGNIFICANT CHANGE UP (ref 3.5–5.3)
PROT SERPL-MCNC: 8 G/DL — SIGNIFICANT CHANGE UP (ref 6.6–8.7)
PROT UR-MCNC: 30 MG/DL
PROTHROM AB SERPL-ACNC: 13.3 SEC — HIGH (ref 9.8–12.7)
RBC # BLD: 4.74 M/UL — SIGNIFICANT CHANGE UP (ref 4.4–5.2)
RBC # FLD: 13 % — SIGNIFICANT CHANGE UP (ref 11–15.6)
RBC BLD AUTO: NORMAL — SIGNIFICANT CHANGE UP
RBC CASTS # UR COMP ASSIST: SIGNIFICANT CHANGE UP /HPF (ref 0–4)
SAO2 % BLDV: 88 % — SIGNIFICANT CHANGE UP
SODIUM SERPL-SCNC: 130 MMOL/L — LOW (ref 135–145)
SP GR SPEC: 1 — LOW (ref 1.01–1.02)
TYPE + AB SCN PNL BLD: SIGNIFICANT CHANGE UP
UROBILINOGEN FLD QL: NEGATIVE MG/DL — SIGNIFICANT CHANGE UP
WBC # BLD: 13 K/UL — HIGH (ref 4.8–10.8)
WBC # FLD AUTO: 13 K/UL — HIGH (ref 4.8–10.8)
WBC UR QL: SIGNIFICANT CHANGE UP

## 2018-07-29 PROCEDURE — 99284 EMERGENCY DEPT VISIT MOD MDM: CPT

## 2018-07-29 PROCEDURE — 74177 CT ABD & PELVIS W/CONTRAST: CPT | Mod: 26

## 2018-07-29 RX ORDER — INSULIN NPH HUM/REG INSULIN HM 70-30/ML
15 VIAL (ML) SUBCUTANEOUS
Qty: 0 | Refills: 0 | COMMUNITY

## 2018-07-29 RX ORDER — IBUPROFEN 200 MG
600 TABLET ORAL ONCE
Qty: 0 | Refills: 0 | Status: COMPLETED | OUTPATIENT
Start: 2018-07-29 | End: 2018-07-29

## 2018-07-29 RX ORDER — ACETAMINOPHEN 500 MG
975 TABLET ORAL ONCE
Qty: 0 | Refills: 0 | Status: COMPLETED | OUTPATIENT
Start: 2018-07-29 | End: 2018-07-29

## 2018-07-29 RX ORDER — INSULIN HUMAN 100 [IU]/ML
10 INJECTION, SOLUTION SUBCUTANEOUS ONCE
Qty: 0 | Refills: 0 | Status: COMPLETED | OUTPATIENT
Start: 2018-07-29 | End: 2018-07-29

## 2018-07-29 RX ORDER — ALBUTEROL 90 UG/1
2 AEROSOL, METERED ORAL
Qty: 0 | Refills: 0 | COMMUNITY

## 2018-07-29 RX ORDER — SODIUM CHLORIDE 9 MG/ML
1000 INJECTION INTRAMUSCULAR; INTRAVENOUS; SUBCUTANEOUS ONCE
Qty: 0 | Refills: 0 | Status: COMPLETED | OUTPATIENT
Start: 2018-07-29 | End: 2018-07-29

## 2018-07-29 RX ORDER — INSULIN HUMAN 100 [IU]/ML
12 INJECTION, SOLUTION SUBCUTANEOUS ONCE
Qty: 0 | Refills: 0 | Status: COMPLETED | OUTPATIENT
Start: 2018-07-29 | End: 2018-07-29

## 2018-07-29 RX ORDER — FLUOXETINE HCL 10 MG
50 CAPSULE ORAL
Qty: 0 | Refills: 0 | COMMUNITY

## 2018-07-29 RX ORDER — INSULIN HUMAN 100 [IU]/ML
10 INJECTION, SOLUTION SUBCUTANEOUS ONCE
Qty: 0 | Refills: 0 | Status: DISCONTINUED | OUTPATIENT
Start: 2018-07-29 | End: 2018-07-29

## 2018-07-29 RX ORDER — INSULIN GLARGINE 100 [IU]/ML
6 INJECTION, SOLUTION SUBCUTANEOUS
Qty: 0 | Refills: 0 | COMMUNITY

## 2018-07-29 RX ORDER — CHOLECALCIFEROL (VITAMIN D3) 125 MCG
1 CAPSULE ORAL
Qty: 0 | Refills: 0 | COMMUNITY

## 2018-07-29 RX ORDER — GABAPENTIN 400 MG/1
2 CAPSULE ORAL
Qty: 0 | Refills: 0 | COMMUNITY

## 2018-07-29 RX ADMIN — Medication 975 MILLIGRAM(S): at 20:32

## 2018-07-29 RX ADMIN — INSULIN HUMAN 10 UNIT(S): 100 INJECTION, SOLUTION SUBCUTANEOUS at 21:55

## 2018-07-29 RX ADMIN — Medication 600 MILLIGRAM(S): at 23:15

## 2018-07-29 RX ADMIN — SODIUM CHLORIDE 1000 MILLILITER(S): 9 INJECTION INTRAMUSCULAR; INTRAVENOUS; SUBCUTANEOUS at 20:32

## 2018-07-29 RX ADMIN — SODIUM CHLORIDE 1000 MILLILITER(S): 9 INJECTION INTRAMUSCULAR; INTRAVENOUS; SUBCUTANEOUS at 21:56

## 2018-07-29 RX ADMIN — SODIUM CHLORIDE 1000 MILLILITER(S): 9 INJECTION INTRAMUSCULAR; INTRAVENOUS; SUBCUTANEOUS at 21:08

## 2018-07-29 RX ADMIN — Medication 975 MILLIGRAM(S): at 21:08

## 2018-07-29 RX ADMIN — Medication 600 MILLIGRAM(S): at 22:44

## 2018-07-29 RX ADMIN — SODIUM CHLORIDE 1000 MILLILITER(S): 9 INJECTION INTRAMUSCULAR; INTRAVENOUS; SUBCUTANEOUS at 22:00

## 2018-07-29 NOTE — ED STATDOCS - OBJECTIVE STATEMENT
This is a 58 year old female with pmhx of anemia, anxiety, depression, bipolar, HTN, high cholesterol, pshx of appendectomy, bowel obstruction s/p HARDY, hysterectomy c/o R sided lower abdominal pain radiating to back x 1 day.  She reports subjective fevers and chills.  She denies any n/v/d or any sick contacts, recent travel or rashes.  Patient has been taking motrin for pain. This is a 58 year old female with pmhx of DM, anemia, anxiety, depression, bipolar, HTN, high cholesterol, pshx of appendectomy, bowel obstruction s/p HARDY, hysterectomy c/o R sided lower abdominal pain radiating to back x 1 day.  She reports subjective fevers and chills.  She notes fevers occur mostly at night.  She denies any n/v/d or any sick contacts, recent travel or rashes.  Patient has been taking motrin for pain.

## 2018-07-29 NOTE — ED STATDOCS - PROGRESS NOTE DETAILS
BS elevated -> 500, added on another bolus of fluids, insulin, VBG and acetone, pending CT care signed out to GRISEL meadows to f/u CT results, and remainder of labs Pt aware of decreasing BG levels. PT states she feels "a lot better." Discussed plan with Pt, Pt is agreeable to plan and will stay for additional medication and iv hydration. PT Educated on diet and follow up with Endocrinologist for appropriate management of BG. Pt BG levels improved. PT states she will follow up with Endocrinologist today. PT advised to continue FS at home and keep a glucose journal. PT verbalized understanding of diagnosis and importance of follow up at PMD. PT educated on importance of follow up and when to return to the ED. Pt BG levels improved last . PT states she will follow up with Endocrinologist today. PT advised to continue FS at home and keep a glucose journal. PT verbalized understanding of diagnosis and importance of follow up at PMD. PT educated on importance of follow up and when to return to the ED.

## 2018-07-29 NOTE — ED ADULT NURSE NOTE - OBJECTIVE STATEMENT
subjective fevers, chills, nausea, weakness, rt pelvic pain. pt denies use of antipyretics. pt has no other complaints at this time. a and o x3. breathing even and unlabored. sitting calm in bed. will continue to monitor.

## 2018-07-29 NOTE — ED STATDOCS - CARE PLAN
Principal Discharge DX:	Abdominal pain Principal Discharge DX:	Abdominal pain  Secondary Diagnosis:	Hyperglycemia

## 2018-07-29 NOTE — ED STATDOCS - ATTENDING CONTRIBUTION TO CARE
58 year old c/o right flank and abdominal pain associated with subjective fever and chills.  Patient with benign abdominal exam however her pain continued.  CT negative for acute pathology.  Patient blood sugar noted to be elevated.  She was given insulin, IVF and labs re-checked with improvement.  Patient was instructed to follow-up with her PMD and endocrinologist.

## 2018-07-30 ENCOUNTER — INPATIENT (INPATIENT)
Facility: HOSPITAL | Age: 58
LOS: 4 days | Discharge: HOME | DRG: 872 | End: 2018-08-04
Attending: INTERNAL MEDICINE | Admitting: HOSPITALIST
Payer: MEDICAID

## 2018-07-30 VITALS
DIASTOLIC BLOOD PRESSURE: 72 MMHG | WEIGHT: 151.02 LBS | RESPIRATION RATE: 22 BRPM | SYSTOLIC BLOOD PRESSURE: 116 MMHG | HEIGHT: 63 IN | OXYGEN SATURATION: 98 % | TEMPERATURE: 99 F | HEART RATE: 99 BPM

## 2018-07-30 DIAGNOSIS — Z98.89 OTHER SPECIFIED POSTPROCEDURAL STATES: Chronic | ICD-10-CM

## 2018-07-30 DIAGNOSIS — A41.9 SEPSIS, UNSPECIFIED ORGANISM: ICD-10-CM

## 2018-07-30 DIAGNOSIS — Z90.710 ACQUIRED ABSENCE OF BOTH CERVIX AND UTERUS: Chronic | ICD-10-CM

## 2018-07-30 DIAGNOSIS — Z87.828 PERSONAL HISTORY OF OTHER (HEALED) PHYSICAL INJURY AND TRAUMA: Chronic | ICD-10-CM

## 2018-07-30 LAB
ACETONE SERPL-MCNC: ABNORMAL
ALBUMIN SERPL ELPH-MCNC: 3.3 G/DL — SIGNIFICANT CHANGE UP (ref 3.3–5.2)
ALP SERPL-CCNC: 58 U/L — SIGNIFICANT CHANGE UP (ref 40–120)
ALT FLD-CCNC: 8 U/L — SIGNIFICANT CHANGE UP
ANION GAP SERPL CALC-SCNC: 16 MMOL/L — SIGNIFICANT CHANGE UP (ref 5–17)
APPEARANCE UR: CLEAR — SIGNIFICANT CHANGE UP
AST SERPL-CCNC: 11 U/L — SIGNIFICANT CHANGE UP
BILIRUB SERPL-MCNC: <0.2 MG/DL — LOW (ref 0.4–2)
BILIRUB UR-MCNC: NEGATIVE — SIGNIFICANT CHANGE UP
BUN SERPL-MCNC: 12 MG/DL — SIGNIFICANT CHANGE UP (ref 8–20)
CALCIUM SERPL-MCNC: 8.4 MG/DL — LOW (ref 8.6–10.2)
CHLORIDE SERPL-SCNC: 101 MMOL/L — SIGNIFICANT CHANGE UP (ref 98–107)
CO2 SERPL-SCNC: 18 MMOL/L — LOW (ref 22–29)
COLOR SPEC: YELLOW — SIGNIFICANT CHANGE UP
CREAT SERPL-MCNC: 0.61 MG/DL — SIGNIFICANT CHANGE UP (ref 0.5–1.3)
CULTURE RESULTS: NO GROWTH — SIGNIFICANT CHANGE UP
DIFF PNL FLD: ABNORMAL
EOSINOPHIL # BLD AUTO: 0 K/UL — SIGNIFICANT CHANGE UP (ref 0–0.5)
EOSINOPHIL NFR BLD AUTO: 0 % — SIGNIFICANT CHANGE UP (ref 0–6)
EPI CELLS # UR: SIGNIFICANT CHANGE UP
GLUCOSE BLDC GLUCOMTR-MCNC: 215 MG/DL — HIGH (ref 70–99)
GLUCOSE BLDC GLUCOMTR-MCNC: 268 MG/DL — HIGH (ref 70–99)
GLUCOSE BLDC GLUCOMTR-MCNC: 312 MG/DL — HIGH (ref 70–99)
GLUCOSE SERPL-MCNC: 277 MG/DL — HIGH (ref 70–115)
GLUCOSE UR QL: 1000 MG/DL
HCT VFR BLD CALC: 34.6 % — LOW (ref 37–47)
HGB BLD-MCNC: 11.4 G/DL — LOW (ref 12–16)
KETONES UR-MCNC: ABNORMAL
LACTATE BLDV-MCNC: 1.7 MMOL/L — SIGNIFICANT CHANGE UP (ref 0.5–2)
LEUKOCYTE ESTERASE UR-ACNC: NEGATIVE — SIGNIFICANT CHANGE UP
LYMPHOCYTES # BLD AUTO: 0.6 K/UL — LOW (ref 1–4.8)
LYMPHOCYTES # BLD AUTO: 5.8 % — LOW (ref 20–55)
MCHC RBC-ENTMCNC: 28.2 PG — SIGNIFICANT CHANGE UP (ref 27–31)
MCHC RBC-ENTMCNC: 32.9 G/DL — SIGNIFICANT CHANGE UP (ref 32–36)
MCV RBC AUTO: 85.6 FL — SIGNIFICANT CHANGE UP (ref 81–99)
MONOCYTES # BLD AUTO: 0.3 K/UL — SIGNIFICANT CHANGE UP (ref 0–0.8)
MONOCYTES NFR BLD AUTO: 2.8 % — LOW (ref 3–10)
NEUTROPHILS # BLD AUTO: 9.6 K/UL — HIGH (ref 1.8–8)
NEUTROPHILS NFR BLD AUTO: 91 % — HIGH (ref 37–73)
NITRITE UR-MCNC: NEGATIVE — SIGNIFICANT CHANGE UP
PH UR: 6 — SIGNIFICANT CHANGE UP (ref 5–8)
PLATELET # BLD AUTO: 234 K/UL — SIGNIFICANT CHANGE UP (ref 150–400)
POTASSIUM SERPL-MCNC: 3.5 MMOL/L — SIGNIFICANT CHANGE UP (ref 3.5–5.3)
POTASSIUM SERPL-SCNC: 3.5 MMOL/L — SIGNIFICANT CHANGE UP (ref 3.5–5.3)
PROT SERPL-MCNC: 6.5 G/DL — LOW (ref 6.6–8.7)
PROT UR-MCNC: 30 MG/DL
RBC # BLD: 4.04 M/UL — LOW (ref 4.4–5.2)
RBC # FLD: 13.2 % — SIGNIFICANT CHANGE UP (ref 11–15.6)
RBC CASTS # UR COMP ASSIST: ABNORMAL /HPF (ref 0–4)
SODIUM SERPL-SCNC: 135 MMOL/L — SIGNIFICANT CHANGE UP (ref 135–145)
SP GR SPEC: 1.01 — SIGNIFICANT CHANGE UP (ref 1.01–1.02)
SPECIMEN SOURCE: SIGNIFICANT CHANGE UP
UROBILINOGEN FLD QL: NEGATIVE MG/DL — SIGNIFICANT CHANGE UP
WBC # BLD: 10.6 K/UL — SIGNIFICANT CHANGE UP (ref 4.8–10.8)
WBC # FLD AUTO: 10.6 K/UL — SIGNIFICANT CHANGE UP (ref 4.8–10.8)
WBC UR QL: SIGNIFICANT CHANGE UP

## 2018-07-30 PROCEDURE — 82947 ASSAY GLUCOSE BLOOD QUANT: CPT

## 2018-07-30 PROCEDURE — 85730 THROMBOPLASTIN TIME PARTIAL: CPT

## 2018-07-30 PROCEDURE — 99223 1ST HOSP IP/OBS HIGH 75: CPT

## 2018-07-30 PROCEDURE — 99222 1ST HOSP IP/OBS MODERATE 55: CPT

## 2018-07-30 PROCEDURE — 84295 ASSAY OF SERUM SODIUM: CPT

## 2018-07-30 PROCEDURE — 84132 ASSAY OF SERUM POTASSIUM: CPT

## 2018-07-30 PROCEDURE — 87040 BLOOD CULTURE FOR BACTERIA: CPT

## 2018-07-30 PROCEDURE — 99284 EMERGENCY DEPT VISIT MOD MDM: CPT | Mod: 25

## 2018-07-30 PROCEDURE — 72158 MRI LUMBAR SPINE W/O & W/DYE: CPT | Mod: 26

## 2018-07-30 PROCEDURE — 82435 ASSAY OF BLOOD CHLORIDE: CPT

## 2018-07-30 PROCEDURE — 99285 EMERGENCY DEPT VISIT HI MDM: CPT | Mod: 25

## 2018-07-30 PROCEDURE — 74177 CT ABD & PELVIS W/CONTRAST: CPT

## 2018-07-30 PROCEDURE — 81025 URINE PREGNANCY TEST: CPT

## 2018-07-30 PROCEDURE — 96374 THER/PROPH/DIAG INJ IV PUSH: CPT | Mod: XU

## 2018-07-30 PROCEDURE — 93010 ELECTROCARDIOGRAM REPORT: CPT

## 2018-07-30 PROCEDURE — 71045 X-RAY EXAM CHEST 1 VIEW: CPT | Mod: 26

## 2018-07-30 PROCEDURE — 82009 KETONE BODYS QUAL: CPT

## 2018-07-30 PROCEDURE — 82962 GLUCOSE BLOOD TEST: CPT

## 2018-07-30 PROCEDURE — 85610 PROTHROMBIN TIME: CPT

## 2018-07-30 PROCEDURE — 82330 ASSAY OF CALCIUM: CPT

## 2018-07-30 PROCEDURE — 96372 THER/PROPH/DIAG INJ SC/IM: CPT | Mod: XU

## 2018-07-30 PROCEDURE — 83690 ASSAY OF LIPASE: CPT

## 2018-07-30 PROCEDURE — 82803 BLOOD GASES ANY COMBINATION: CPT

## 2018-07-30 PROCEDURE — 81001 URINALYSIS AUTO W/SCOPE: CPT

## 2018-07-30 PROCEDURE — 80053 COMPREHEN METABOLIC PANEL: CPT

## 2018-07-30 PROCEDURE — 86901 BLOOD TYPING SEROLOGIC RH(D): CPT

## 2018-07-30 PROCEDURE — 36415 COLL VENOUS BLD VENIPUNCTURE: CPT

## 2018-07-30 PROCEDURE — 87086 URINE CULTURE/COLONY COUNT: CPT

## 2018-07-30 PROCEDURE — 85027 COMPLETE CBC AUTOMATED: CPT

## 2018-07-30 PROCEDURE — 96375 TX/PRO/DX INJ NEW DRUG ADDON: CPT

## 2018-07-30 PROCEDURE — 96361 HYDRATE IV INFUSION ADD-ON: CPT

## 2018-07-30 PROCEDURE — 86900 BLOOD TYPING SEROLOGIC ABO: CPT

## 2018-07-30 PROCEDURE — 83605 ASSAY OF LACTIC ACID: CPT

## 2018-07-30 PROCEDURE — 86850 RBC ANTIBODY SCREEN: CPT

## 2018-07-30 PROCEDURE — 85014 HEMATOCRIT: CPT

## 2018-07-30 RX ORDER — SODIUM CHLORIDE 9 MG/ML
1000 INJECTION, SOLUTION INTRAVENOUS ONCE
Qty: 0 | Refills: 0 | Status: COMPLETED | OUTPATIENT
Start: 2018-07-30 | End: 2018-07-30

## 2018-07-30 RX ORDER — DEXTROSE 50 % IN WATER 50 %
25 SYRINGE (ML) INTRAVENOUS ONCE
Qty: 0 | Refills: 0 | Status: DISCONTINUED | OUTPATIENT
Start: 2018-07-30 | End: 2018-08-04

## 2018-07-30 RX ORDER — DEXTROSE 50 % IN WATER 50 %
15 SYRINGE (ML) INTRAVENOUS ONCE
Qty: 0 | Refills: 0 | Status: DISCONTINUED | OUTPATIENT
Start: 2018-07-30 | End: 2018-08-04

## 2018-07-30 RX ORDER — SODIUM CHLORIDE 9 MG/ML
1000 INJECTION INTRAMUSCULAR; INTRAVENOUS; SUBCUTANEOUS ONCE
Qty: 0 | Refills: 0 | Status: DISCONTINUED | OUTPATIENT
Start: 2018-07-30 | End: 2018-07-30

## 2018-07-30 RX ORDER — DEXTROSE 50 % IN WATER 50 %
12.5 SYRINGE (ML) INTRAVENOUS ONCE
Qty: 0 | Refills: 0 | Status: DISCONTINUED | OUTPATIENT
Start: 2018-07-30 | End: 2018-08-04

## 2018-07-30 RX ORDER — SODIUM CHLORIDE 9 MG/ML
3 INJECTION INTRAMUSCULAR; INTRAVENOUS; SUBCUTANEOUS ONCE
Qty: 0 | Refills: 0 | Status: COMPLETED | OUTPATIENT
Start: 2018-07-30 | End: 2018-07-30

## 2018-07-30 RX ORDER — ACETAMINOPHEN 500 MG
650 TABLET ORAL EVERY 6 HOURS
Qty: 0 | Refills: 0 | Status: DISCONTINUED | OUTPATIENT
Start: 2018-07-30 | End: 2018-08-04

## 2018-07-30 RX ORDER — QUETIAPINE FUMARATE 200 MG/1
150 TABLET, FILM COATED ORAL AT BEDTIME
Qty: 0 | Refills: 0 | Status: DISCONTINUED | OUTPATIENT
Start: 2018-07-30 | End: 2018-08-04

## 2018-07-30 RX ORDER — CYCLOBENZAPRINE HYDROCHLORIDE 10 MG/1
10 TABLET, FILM COATED ORAL ONCE
Qty: 0 | Refills: 0 | Status: COMPLETED | OUTPATIENT
Start: 2018-07-30 | End: 2018-07-30

## 2018-07-30 RX ORDER — ACETAMINOPHEN 500 MG
650 TABLET ORAL ONCE
Qty: 0 | Refills: 0 | Status: COMPLETED | OUTPATIENT
Start: 2018-07-30 | End: 2018-07-30

## 2018-07-30 RX ORDER — ASPIRIN/CALCIUM CARB/MAGNESIUM 324 MG
81 TABLET ORAL DAILY
Qty: 0 | Refills: 0 | Status: DISCONTINUED | OUTPATIENT
Start: 2018-07-30 | End: 2018-08-04

## 2018-07-30 RX ORDER — METHOCARBAMOL 500 MG/1
750 TABLET, FILM COATED ORAL
Qty: 0 | Refills: 0 | Status: DISCONTINUED | OUTPATIENT
Start: 2018-07-30 | End: 2018-08-04

## 2018-07-30 RX ORDER — ALBUTEROL 90 UG/1
2 AEROSOL, METERED ORAL EVERY 6 HOURS
Qty: 0 | Refills: 0 | Status: DISCONTINUED | OUTPATIENT
Start: 2018-07-30 | End: 2018-08-04

## 2018-07-30 RX ORDER — CEFTRIAXONE 500 MG/1
1 INJECTION, POWDER, FOR SOLUTION INTRAMUSCULAR; INTRAVENOUS ONCE
Qty: 0 | Refills: 0 | Status: COMPLETED | OUTPATIENT
Start: 2018-07-30 | End: 2018-07-30

## 2018-07-30 RX ORDER — VANCOMYCIN HCL 1 G
1000 VIAL (EA) INTRAVENOUS ONCE
Qty: 0 | Refills: 0 | Status: COMPLETED | OUTPATIENT
Start: 2018-07-30 | End: 2018-07-30

## 2018-07-30 RX ORDER — SIMVASTATIN 20 MG/1
20 TABLET, FILM COATED ORAL AT BEDTIME
Qty: 0 | Refills: 0 | Status: DISCONTINUED | OUTPATIENT
Start: 2018-07-30 | End: 2018-08-04

## 2018-07-30 RX ORDER — INSULIN GLARGINE 100 [IU]/ML
20 INJECTION, SOLUTION SUBCUTANEOUS AT BEDTIME
Qty: 0 | Refills: 0 | Status: DISCONTINUED | OUTPATIENT
Start: 2018-07-30 | End: 2018-08-01

## 2018-07-30 RX ORDER — BUDESONIDE AND FORMOTEROL FUMARATE DIHYDRATE 160; 4.5 UG/1; UG/1
2 AEROSOL RESPIRATORY (INHALATION)
Qty: 0 | Refills: 0 | Status: DISCONTINUED | OUTPATIENT
Start: 2018-07-30 | End: 2018-08-04

## 2018-07-30 RX ORDER — ENOXAPARIN SODIUM 100 MG/ML
40 INJECTION SUBCUTANEOUS DAILY
Qty: 0 | Refills: 0 | Status: DISCONTINUED | OUTPATIENT
Start: 2018-07-30 | End: 2018-08-04

## 2018-07-30 RX ORDER — VANCOMYCIN HCL 1 G
1000 VIAL (EA) INTRAVENOUS EVERY 12 HOURS
Qty: 0 | Refills: 0 | Status: DISCONTINUED | OUTPATIENT
Start: 2018-07-30 | End: 2018-08-01

## 2018-07-30 RX ORDER — SODIUM CHLORIDE 9 MG/ML
1000 INJECTION INTRAMUSCULAR; INTRAVENOUS; SUBCUTANEOUS ONCE
Qty: 0 | Refills: 0 | Status: COMPLETED | OUTPATIENT
Start: 2018-07-30 | End: 2018-07-30

## 2018-07-30 RX ORDER — SODIUM CHLORIDE 9 MG/ML
1000 INJECTION, SOLUTION INTRAVENOUS
Qty: 0 | Refills: 0 | Status: DISCONTINUED | OUTPATIENT
Start: 2018-07-30 | End: 2018-08-04

## 2018-07-30 RX ORDER — KETOROLAC TROMETHAMINE 30 MG/ML
15 SYRINGE (ML) INJECTION EVERY 6 HOURS
Qty: 0 | Refills: 0 | Status: DISCONTINUED | OUTPATIENT
Start: 2018-07-30 | End: 2018-08-03

## 2018-07-30 RX ORDER — LISINOPRIL 2.5 MG/1
2.5 TABLET ORAL DAILY
Qty: 0 | Refills: 0 | Status: DISCONTINUED | OUTPATIENT
Start: 2018-07-30 | End: 2018-07-30

## 2018-07-30 RX ORDER — INSULIN HUMAN 100 [IU]/ML
10 INJECTION, SOLUTION SUBCUTANEOUS ONCE
Qty: 0 | Refills: 0 | Status: COMPLETED | OUTPATIENT
Start: 2018-07-30 | End: 2018-07-30

## 2018-07-30 RX ORDER — INSULIN GLARGINE 100 [IU]/ML
10 INJECTION, SOLUTION SUBCUTANEOUS ONCE
Qty: 0 | Refills: 0 | Status: COMPLETED | OUTPATIENT
Start: 2018-07-30 | End: 2018-07-30

## 2018-07-30 RX ORDER — GLUCAGON INJECTION, SOLUTION 0.5 MG/.1ML
1 INJECTION, SOLUTION SUBCUTANEOUS ONCE
Qty: 0 | Refills: 0 | Status: DISCONTINUED | OUTPATIENT
Start: 2018-07-30 | End: 2018-08-04

## 2018-07-30 RX ORDER — KETOROLAC TROMETHAMINE 30 MG/ML
15 SYRINGE (ML) INJECTION ONCE
Qty: 0 | Refills: 0 | Status: DISCONTINUED | OUTPATIENT
Start: 2018-07-30 | End: 2018-07-30

## 2018-07-30 RX ORDER — KETOROLAC TROMETHAMINE 30 MG/ML
30 SYRINGE (ML) INJECTION ONCE
Qty: 0 | Refills: 0 | Status: DISCONTINUED | OUTPATIENT
Start: 2018-07-30 | End: 2018-07-30

## 2018-07-30 RX ORDER — FLUOXETINE HCL 10 MG
40 CAPSULE ORAL DAILY
Qty: 0 | Refills: 0 | Status: DISCONTINUED | OUTPATIENT
Start: 2018-07-30 | End: 2018-08-04

## 2018-07-30 RX ORDER — VANCOMYCIN HCL 1 G
1000 VIAL (EA) INTRAVENOUS EVERY 12 HOURS
Qty: 0 | Refills: 0 | Status: DISCONTINUED | OUTPATIENT
Start: 2018-07-30 | End: 2018-07-30

## 2018-07-30 RX ORDER — CHOLECALCIFEROL (VITAMIN D3) 125 MCG
1000 CAPSULE ORAL DAILY
Qty: 0 | Refills: 0 | Status: DISCONTINUED | OUTPATIENT
Start: 2018-07-30 | End: 2018-08-04

## 2018-07-30 RX ORDER — GABAPENTIN 400 MG/1
600 CAPSULE ORAL
Qty: 0 | Refills: 0 | Status: DISCONTINUED | OUTPATIENT
Start: 2018-07-30 | End: 2018-08-04

## 2018-07-30 RX ORDER — SACCHAROMYCES BOULARDII 250 MG
250 POWDER IN PACKET (EA) ORAL
Qty: 0 | Refills: 0 | Status: DISCONTINUED | OUTPATIENT
Start: 2018-07-30 | End: 2018-08-04

## 2018-07-30 RX ORDER — SODIUM CHLORIDE 9 MG/ML
1000 INJECTION INTRAMUSCULAR; INTRAVENOUS; SUBCUTANEOUS
Qty: 0 | Refills: 0 | Status: DISCONTINUED | OUTPATIENT
Start: 2018-07-30 | End: 2018-08-04

## 2018-07-30 RX ORDER — INSULIN GLARGINE 100 [IU]/ML
10 INJECTION, SOLUTION SUBCUTANEOUS AT BEDTIME
Qty: 0 | Refills: 0 | Status: DISCONTINUED | OUTPATIENT
Start: 2018-07-30 | End: 2018-07-30

## 2018-07-30 RX ORDER — INSULIN LISPRO 100/ML
VIAL (ML) SUBCUTANEOUS
Qty: 0 | Refills: 0 | Status: DISCONTINUED | OUTPATIENT
Start: 2018-07-30 | End: 2018-08-04

## 2018-07-30 RX ORDER — INSULIN LISPRO 100/ML
5 VIAL (ML) SUBCUTANEOUS
Qty: 0 | Refills: 0 | Status: DISCONTINUED | OUTPATIENT
Start: 2018-07-30 | End: 2018-08-04

## 2018-07-30 RX ORDER — CEFTRIAXONE 500 MG/1
1 INJECTION, POWDER, FOR SOLUTION INTRAMUSCULAR; INTRAVENOUS EVERY 24 HOURS
Qty: 0 | Refills: 0 | Status: DISCONTINUED | OUTPATIENT
Start: 2018-07-31 | End: 2018-08-03

## 2018-07-30 RX ADMIN — SODIUM CHLORIDE 1000 MILLILITER(S): 9 INJECTION INTRAMUSCULAR; INTRAVENOUS; SUBCUTANEOUS at 09:04

## 2018-07-30 RX ADMIN — SODIUM CHLORIDE 2000 MILLILITER(S): 9 INJECTION, SOLUTION INTRAVENOUS at 12:27

## 2018-07-30 RX ADMIN — SODIUM CHLORIDE 150 MILLILITER(S): 9 INJECTION INTRAMUSCULAR; INTRAVENOUS; SUBCUTANEOUS at 22:18

## 2018-07-30 RX ADMIN — INSULIN HUMAN 12 UNIT(S): 100 INJECTION, SOLUTION SUBCUTANEOUS at 00:01

## 2018-07-30 RX ADMIN — SODIUM CHLORIDE 1000 MILLILITER(S): 9 INJECTION INTRAMUSCULAR; INTRAVENOUS; SUBCUTANEOUS at 00:01

## 2018-07-30 RX ADMIN — Medication 250 MILLIGRAM(S): at 18:00

## 2018-07-30 RX ADMIN — Medication 30 MILLIGRAM(S): at 10:16

## 2018-07-30 RX ADMIN — Medication 650 MILLIGRAM(S): at 09:03

## 2018-07-30 RX ADMIN — SODIUM CHLORIDE 1000 MILLILITER(S): 9 INJECTION INTRAMUSCULAR; INTRAVENOUS; SUBCUTANEOUS at 01:15

## 2018-07-30 RX ADMIN — Medication 3: at 18:02

## 2018-07-30 RX ADMIN — Medication 81 MILLIGRAM(S): at 13:54

## 2018-07-30 RX ADMIN — INSULIN GLARGINE 20 UNIT(S): 100 INJECTION, SOLUTION SUBCUTANEOUS at 22:27

## 2018-07-30 RX ADMIN — CEFTRIAXONE 1 GRAM(S): 500 INJECTION, POWDER, FOR SOLUTION INTRAMUSCULAR; INTRAVENOUS at 09:30

## 2018-07-30 RX ADMIN — CYCLOBENZAPRINE HYDROCHLORIDE 10 MILLIGRAM(S): 10 TABLET, FILM COATED ORAL at 09:03

## 2018-07-30 RX ADMIN — SODIUM CHLORIDE 3 MILLILITER(S): 9 INJECTION INTRAMUSCULAR; INTRAVENOUS; SUBCUTANEOUS at 09:07

## 2018-07-30 RX ADMIN — SODIUM CHLORIDE 1000 MILLILITER(S): 9 INJECTION INTRAMUSCULAR; INTRAVENOUS; SUBCUTANEOUS at 10:10

## 2018-07-30 RX ADMIN — Medication 5 UNIT(S): at 18:02

## 2018-07-30 RX ADMIN — Medication 250 MILLIGRAM(S): at 23:00

## 2018-07-30 RX ADMIN — SIMVASTATIN 20 MILLIGRAM(S): 20 TABLET, FILM COATED ORAL at 22:12

## 2018-07-30 RX ADMIN — Medication 250 MILLIGRAM(S): at 09:30

## 2018-07-30 RX ADMIN — INSULIN GLARGINE 10 UNIT(S): 100 INJECTION, SOLUTION SUBCUTANEOUS at 01:15

## 2018-07-30 RX ADMIN — Medication 15 MILLIGRAM(S): at 01:15

## 2018-07-30 RX ADMIN — QUETIAPINE FUMARATE 150 MILLIGRAM(S): 200 TABLET, FILM COATED ORAL at 22:11

## 2018-07-30 RX ADMIN — CEFTRIAXONE 100 GRAM(S): 500 INJECTION, POWDER, FOR SOLUTION INTRAMUSCULAR; INTRAVENOUS at 09:10

## 2018-07-30 RX ADMIN — Medication 1000 MILLIGRAM(S): at 10:30

## 2018-07-30 RX ADMIN — Medication 2: at 14:01

## 2018-07-30 RX ADMIN — Medication 325 MILLIGRAM(S): at 16:15

## 2018-07-30 RX ADMIN — GABAPENTIN 600 MILLIGRAM(S): 400 CAPSULE ORAL at 18:01

## 2018-07-30 RX ADMIN — SODIUM CHLORIDE 150 MILLILITER(S): 9 INJECTION INTRAMUSCULAR; INTRAVENOUS; SUBCUTANEOUS at 13:54

## 2018-07-30 RX ADMIN — Medication 30 MILLIGRAM(S): at 09:03

## 2018-07-30 RX ADMIN — INSULIN HUMAN 10 UNIT(S): 100 INJECTION, SOLUTION SUBCUTANEOUS at 14:03

## 2018-07-30 RX ADMIN — METHOCARBAMOL 750 MILLIGRAM(S): 500 TABLET, FILM COATED ORAL at 18:01

## 2018-07-30 NOTE — ED ADULT TRIAGE NOTE - CHIEF COMPLAINT QUOTE
"I was here earlier & went home, went to sleep, woke up about an hour & a half later with pain to my right side of my abdomen & my back & chills." Denies any nausea, states "feels very thirsty". Has Hx of diabetes. Stated was in ED earlier for same pain, had CT done & was told she has a cyst on her right kidney.

## 2018-07-30 NOTE — H&P ADULT - ASSESSMENT
57 year old female with a PMHx of Recurrent UTIs (has duplicated right collecting system), DM2, HLD, Bipolar Disorder, Depression and Anxiety, asthma and PTSD presenting to the ED with chills, fever of 101, right sided flank pain radiating to groin, mild dysuria.  Patient was seen at [facility] earlier this morning regarding same sx and had a UA which was negative for infection, CT workup showing questionable mass on the left kidney.  She was given sx treatment and DM management and discharged home.  Patient states she went home and the chills & flank pain worsened so she came right back.  Denies any chest pain, palpitations, sob, light headedness/dizziness, difficulty breathing/cough,  n/v, diarrhea/constipation, increased urinary frequency.   Did have 1 episode of diarrhea (non bloody) today.  No recent travel or sick contacts or recent abx.  Patient states similar sx in past due to a UTI. Pt was recently discharged from [facility] on 4/27 after being treated for a UA -ve but culture positive pyelonephritis. She was being followed by Dr. Arriaga who had recommended prophylactic macrobid she was stopped by him some time back. Of note, pt has an epidural injection by pain management 1 month ago but does not report increased pain at that site. Pt had a temp of 104.6 on arrival. UA -ve.  Blood sugar on arrival was 500+, no DKA, now down to 277 now after insulin.         >Sepsis due to Clinical Pyelonephritis r/o spine abscess-   Admit  IVF  lactate wnl  IV Rocephin & Vanco  UA -ve  but has a hx of  UA -ve but culture positive UTI & pyelonephritis  F/u Ucx, blood cx  CXR -ve  ID consult called  CT A/P: No explanation for abdominal pain on this CT.Status post hysterectomy without small bowel obstruction.1.2 cm left renal hypodensity was not appreciated on the previous CT of April 24, 2018. Further characterization with renal mass protocol MRI or ultrasound may be considered.  Renal US ordered  MRI of L spine ordered      >Recurrent UTIs (has duplicated right collecting system)- f/u urology as outpt    >Uncontrolled DM2- last A1c 9.9, pt was on metformin, basiglar 6 & humulin 70/30 15U bid HLD, check A1c, stop home meds, add lantus 20, humalog 5, ISS, F/u FS, consider endocrine consult    >Bipolar Disorder- c/w seroquel  > Depression and Anxiety- c/w prozac  >asthma - c/w inhalers    DVT ppx

## 2018-07-30 NOTE — ED ADULT NURSE REASSESSMENT NOTE - NS ED NURSE REASSESS COMMENT FT1
Dr Yen was made aware of pt BP 83 SBP as charted, examined pt at bedside, no new orders, will continue to monitor.

## 2018-07-30 NOTE — ED PROVIDER NOTE - PROGRESS NOTE DETAILS
rectal temp taken and read for 104.6 F. Pt experiencing rigors and tachycardia. Sepsis recognized at this time.

## 2018-07-30 NOTE — ED PROVIDER NOTE - MEDICAL DECISION MAKING DETAILS
57 year old female with back pain and chills.  Rectal temp 104.6.  Will obtain septic workup and re-evaluate.

## 2018-07-30 NOTE — H&P ADULT - FAMILY HISTORY
Family history of hypertension     Family history of heart disease     Sibling  Still living? Unknown  Family history of diabetes mellitus, Age at diagnosis: Age Unknown  Family history of rheumatoid arthritis, Age at diagnosis: Age Unknown  Family history of asthma, Age at diagnosis: Age Unknown

## 2018-07-30 NOTE — ED PROVIDER NOTE - ATTENDING CONTRIBUTION TO CARE
seen with acp: hx UA-negative, but culture-positive UTI with klebsiella; now presents febrile with right sided flank pain radiating to abd and right leg; +chills rigors; +104 temp in ED; on exam in mild distress, +right CVA ttp; +right leg raise; also states had epidural for sciatica 3 weeks ago; abd soft, NT, ND; ct from a few hours ago essentially normal; will admit for sepsis likely secondary to pyelonephritis, pending MRI lumbar to r/o epidural abscess; d/w Dr Meade for admission
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
## 2018-07-30 NOTE — H&P ADULT - HISTORY OF PRESENT ILLNESS
57 year old female with a PMHx of Recurrent UTIs (has duplicated right collecting system), DM2, HLD, Bipolar Disorder, Depression and Anxiety, asthma and PTSD presenting to the ED with chills, fever of 101, right sided flank pain radiating to groin, mild dysuria.  Patient was seen at [facility] earlier this morning regarding same sx and had a UA which was negative for infection, CT workup showing questionable mass on the left kidney.  She was given sx treatment and DM management and discharged home.  Patient states she went home and the chills & flank pain worsened so she came right back.  Denies any chest pain, palpitations, sob, light headedness/dizziness, difficulty breathing/cough,  n/v, diarrhea/constipation, increased urinary frequency.   Did have 1 episode of diarrhea (non bloody) today.  No recent travel or sick contacts or recent abx.  Patient states similar sx in past due to a UTI. Pt was recently discharged from [facility] on 4/27 after being treated for a UA -ve but culture positive pyelonephritis. She was being followed by Dr. Arriaga who had recommended prophylactic macrobid she was stopped by him some time back. Of note, pt has an epidural injection by pain management 1 month ago but does not report increased pain at that site. Pt had a temp of 104.6 on arrival. UA -ve.  Blood sugar on arrival was 500+, no DKA, now down to 277 now after insulin.

## 2018-07-30 NOTE — ED PROVIDER NOTE - GASTROINTESTINAL, MLM
Right sided CVA tenderness, tenderness to right side of back and right sided pelvic tenderness Non-tender bladder

## 2018-07-31 LAB
ANION GAP SERPL CALC-SCNC: 13 MMOL/L — SIGNIFICANT CHANGE UP (ref 5–17)
BASOPHILS # BLD AUTO: 0 K/UL — SIGNIFICANT CHANGE UP (ref 0–0.2)
BASOPHILS NFR BLD AUTO: 0.1 % — SIGNIFICANT CHANGE UP (ref 0–2)
BUN SERPL-MCNC: 7 MG/DL — LOW (ref 8–20)
CALCIUM SERPL-MCNC: 7.7 MG/DL — LOW (ref 8.6–10.2)
CHLORIDE SERPL-SCNC: 106 MMOL/L — SIGNIFICANT CHANGE UP (ref 98–107)
CO2 SERPL-SCNC: 21 MMOL/L — LOW (ref 22–29)
CREAT SERPL-MCNC: 0.64 MG/DL — SIGNIFICANT CHANGE UP (ref 0.5–1.3)
CULTURE RESULTS: SIGNIFICANT CHANGE UP
EOSINOPHIL # BLD AUTO: 0.1 K/UL — SIGNIFICANT CHANGE UP (ref 0–0.5)
EOSINOPHIL NFR BLD AUTO: 0.7 % — SIGNIFICANT CHANGE UP (ref 0–6)
GLUCOSE BLDC GLUCOMTR-MCNC: 207 MG/DL — HIGH (ref 70–99)
GLUCOSE BLDC GLUCOMTR-MCNC: 218 MG/DL — HIGH (ref 70–99)
GLUCOSE BLDC GLUCOMTR-MCNC: 251 MG/DL — HIGH (ref 70–99)
GLUCOSE BLDC GLUCOMTR-MCNC: 287 MG/DL — HIGH (ref 70–99)
GLUCOSE SERPL-MCNC: 246 MG/DL — HIGH (ref 70–115)
HBA1C BLD-MCNC: 9.6 % — HIGH (ref 4–5.6)
HCT VFR BLD CALC: 30.7 % — LOW (ref 37–47)
HGB BLD-MCNC: 10 G/DL — LOW (ref 12–16)
LYMPHOCYTES # BLD AUTO: 1.3 K/UL — SIGNIFICANT CHANGE UP (ref 1–4.8)
LYMPHOCYTES # BLD AUTO: 15.6 % — LOW (ref 20–55)
MAGNESIUM SERPL-MCNC: 1.5 MG/DL — LOW (ref 1.6–2.6)
MCHC RBC-ENTMCNC: 28.4 PG — SIGNIFICANT CHANGE UP (ref 27–31)
MCHC RBC-ENTMCNC: 32.6 G/DL — SIGNIFICANT CHANGE UP (ref 32–36)
MCV RBC AUTO: 87.2 FL — SIGNIFICANT CHANGE UP (ref 81–99)
MONOCYTES # BLD AUTO: 0.8 K/UL — SIGNIFICANT CHANGE UP (ref 0–0.8)
MONOCYTES NFR BLD AUTO: 10.1 % — HIGH (ref 3–10)
NEUTROPHILS # BLD AUTO: 6 K/UL — SIGNIFICANT CHANGE UP (ref 1.8–8)
NEUTROPHILS NFR BLD AUTO: 73.1 % — HIGH (ref 37–73)
PHOSPHATE SERPL-MCNC: 2.1 MG/DL — LOW (ref 2.4–4.7)
PLATELET # BLD AUTO: 204 K/UL — SIGNIFICANT CHANGE UP (ref 150–400)
POTASSIUM SERPL-MCNC: 3.1 MMOL/L — LOW (ref 3.5–5.3)
POTASSIUM SERPL-SCNC: 3.1 MMOL/L — LOW (ref 3.5–5.3)
RBC # BLD: 3.52 M/UL — LOW (ref 4.4–5.2)
RBC # FLD: 13.6 % — SIGNIFICANT CHANGE UP (ref 11–15.6)
SODIUM SERPL-SCNC: 140 MMOL/L — SIGNIFICANT CHANGE UP (ref 135–145)
SPECIMEN SOURCE: SIGNIFICANT CHANGE UP
VANCOMYCIN TROUGH SERPL-MCNC: 9 UG/ML — LOW (ref 10–20)
WBC # BLD: 8.3 K/UL — SIGNIFICANT CHANGE UP (ref 4.8–10.8)
WBC # FLD AUTO: 8.3 K/UL — SIGNIFICANT CHANGE UP (ref 4.8–10.8)

## 2018-07-31 PROCEDURE — 76775 US EXAM ABDO BACK WALL LIM: CPT | Mod: 26

## 2018-07-31 PROCEDURE — 99232 SBSQ HOSP IP/OBS MODERATE 35: CPT

## 2018-07-31 PROCEDURE — 99233 SBSQ HOSP IP/OBS HIGH 50: CPT

## 2018-07-31 RX ORDER — OXYCODONE AND ACETAMINOPHEN 5; 325 MG/1; MG/1
2 TABLET ORAL EVERY 6 HOURS
Qty: 0 | Refills: 0 | Status: DISCONTINUED | OUTPATIENT
Start: 2018-07-31 | End: 2018-08-04

## 2018-07-31 RX ORDER — MAGNESIUM SULFATE 500 MG/ML
2 VIAL (ML) INJECTION DAILY
Qty: 0 | Refills: 0 | Status: DISCONTINUED | OUTPATIENT
Start: 2018-07-31 | End: 2018-08-04

## 2018-07-31 RX ORDER — MAGNESIUM OXIDE 400 MG ORAL TABLET 241.3 MG
400 TABLET ORAL
Qty: 0 | Refills: 0 | Status: DISCONTINUED | OUTPATIENT
Start: 2018-07-31 | End: 2018-08-04

## 2018-07-31 RX ORDER — POTASSIUM PHOSPHATE, MONOBASIC POTASSIUM PHOSPHATE, DIBASIC 236; 224 MG/ML; MG/ML
15 INJECTION, SOLUTION INTRAVENOUS ONCE
Qty: 0 | Refills: 0 | Status: COMPLETED | OUTPATIENT
Start: 2018-07-31 | End: 2018-07-31

## 2018-07-31 RX ADMIN — Medication 3: at 09:00

## 2018-07-31 RX ADMIN — CEFTRIAXONE 100 GRAM(S): 500 INJECTION, POWDER, FOR SOLUTION INTRAMUSCULAR; INTRAVENOUS at 05:13

## 2018-07-31 RX ADMIN — Medication 3: at 12:49

## 2018-07-31 RX ADMIN — GABAPENTIN 600 MILLIGRAM(S): 400 CAPSULE ORAL at 17:47

## 2018-07-31 RX ADMIN — METHOCARBAMOL 750 MILLIGRAM(S): 500 TABLET, FILM COATED ORAL at 05:14

## 2018-07-31 RX ADMIN — Medication 15 MILLIGRAM(S): at 05:26

## 2018-07-31 RX ADMIN — Medication 5 UNIT(S): at 12:49

## 2018-07-31 RX ADMIN — ENOXAPARIN SODIUM 40 MILLIGRAM(S): 100 INJECTION SUBCUTANEOUS at 12:49

## 2018-07-31 RX ADMIN — Medication 2: at 17:38

## 2018-07-31 RX ADMIN — QUETIAPINE FUMARATE 150 MILLIGRAM(S): 200 TABLET, FILM COATED ORAL at 22:36

## 2018-07-31 RX ADMIN — Medication 15 MILLIGRAM(S): at 05:15

## 2018-07-31 RX ADMIN — GABAPENTIN 600 MILLIGRAM(S): 400 CAPSULE ORAL at 05:15

## 2018-07-31 RX ADMIN — Medication 1000 UNIT(S): at 17:47

## 2018-07-31 RX ADMIN — Medication 15 MILLIGRAM(S): at 17:32

## 2018-07-31 RX ADMIN — Medication 250 MILLIGRAM(S): at 09:49

## 2018-07-31 RX ADMIN — Medication 250 MILLIGRAM(S): at 17:37

## 2018-07-31 RX ADMIN — OXYCODONE AND ACETAMINOPHEN 2 TABLET(S): 5; 325 TABLET ORAL at 18:37

## 2018-07-31 RX ADMIN — INSULIN GLARGINE 20 UNIT(S): 100 INJECTION, SOLUTION SUBCUTANEOUS at 22:35

## 2018-07-31 RX ADMIN — MAGNESIUM OXIDE 400 MG ORAL TABLET 400 MILLIGRAM(S): 241.3 TABLET ORAL at 17:46

## 2018-07-31 RX ADMIN — Medication 40 MILLIGRAM(S): at 17:38

## 2018-07-31 RX ADMIN — OXYCODONE AND ACETAMINOPHEN 2 TABLET(S): 5; 325 TABLET ORAL at 17:37

## 2018-07-31 RX ADMIN — Medication 50 GRAM(S): at 22:36

## 2018-07-31 RX ADMIN — SODIUM CHLORIDE 150 MILLILITER(S): 9 INJECTION INTRAMUSCULAR; INTRAVENOUS; SUBCUTANEOUS at 05:13

## 2018-07-31 RX ADMIN — Medication 81 MILLIGRAM(S): at 12:49

## 2018-07-31 RX ADMIN — SIMVASTATIN 20 MILLIGRAM(S): 20 TABLET, FILM COATED ORAL at 22:36

## 2018-07-31 RX ADMIN — BUDESONIDE AND FORMOTEROL FUMARATE DIHYDRATE 2 PUFF(S): 160; 4.5 AEROSOL RESPIRATORY (INHALATION) at 08:24

## 2018-07-31 RX ADMIN — BUDESONIDE AND FORMOTEROL FUMARATE DIHYDRATE 2 PUFF(S): 160; 4.5 AEROSOL RESPIRATORY (INHALATION) at 19:48

## 2018-07-31 RX ADMIN — Medication 5 UNIT(S): at 09:00

## 2018-07-31 RX ADMIN — Medication 5 UNIT(S): at 17:38

## 2018-07-31 RX ADMIN — METHOCARBAMOL 750 MILLIGRAM(S): 500 TABLET, FILM COATED ORAL at 22:36

## 2018-07-31 RX ADMIN — Medication 250 MILLIGRAM(S): at 05:14

## 2018-07-31 RX ADMIN — Medication 15 MILLIGRAM(S): at 13:41

## 2018-07-31 RX ADMIN — POTASSIUM PHOSPHATE, MONOBASIC POTASSIUM PHOSPHATE, DIBASIC 62.5 MILLIMOLE(S): 236; 224 INJECTION, SOLUTION INTRAVENOUS at 17:39

## 2018-07-31 RX ADMIN — Medication 650 MILLIGRAM(S): at 05:14
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
## 2018-07-31 NOTE — CONSULT NOTE ADULT - ASSESSMENT
A/P: reviewed above findings with the patient and .  There is no definitive evidence of UTI.  The right flank pain is exacerbated with movement and position and exertion.  This may indicate a musculoskeletal etiology of her pain rather than pyelonephritis.  Thre is no hydronephrosis on her sonogram or her CT scan.  She does have a small renal cyst which is benign and I reassured the patient.  Would check Ucx and treat appropriately.  No urologic intervention.  Will see prn.

## 2018-08-01 LAB
ANION GAP SERPL CALC-SCNC: 12 MMOL/L — SIGNIFICANT CHANGE UP (ref 5–17)
BASOPHILS # BLD AUTO: 0 K/UL — SIGNIFICANT CHANGE UP (ref 0–0.2)
BASOPHILS NFR BLD AUTO: 0.2 % — SIGNIFICANT CHANGE UP (ref 0–2)
BUN SERPL-MCNC: 6 MG/DL — LOW (ref 8–20)
CALCIUM SERPL-MCNC: 8 MG/DL — LOW (ref 8.6–10.2)
CHLORIDE SERPL-SCNC: 105 MMOL/L — SIGNIFICANT CHANGE UP (ref 98–107)
CO2 SERPL-SCNC: 22 MMOL/L — SIGNIFICANT CHANGE UP (ref 22–29)
CREAT SERPL-MCNC: 0.48 MG/DL — LOW (ref 0.5–1.3)
EOSINOPHIL # BLD AUTO: 0.1 K/UL — SIGNIFICANT CHANGE UP (ref 0–0.5)
EOSINOPHIL NFR BLD AUTO: 2.5 % — SIGNIFICANT CHANGE UP (ref 0–6)
GLUCOSE BLDC GLUCOMTR-MCNC: 137 MG/DL — HIGH (ref 70–99)
GLUCOSE BLDC GLUCOMTR-MCNC: 201 MG/DL — HIGH (ref 70–99)
GLUCOSE BLDC GLUCOMTR-MCNC: 284 MG/DL — HIGH (ref 70–99)
GLUCOSE BLDC GLUCOMTR-MCNC: 292 MG/DL — HIGH (ref 70–99)
GLUCOSE SERPL-MCNC: 281 MG/DL — HIGH (ref 70–115)
HCT VFR BLD CALC: 31.1 % — LOW (ref 37–47)
HGB BLD-MCNC: 10.1 G/DL — LOW (ref 12–16)
LYMPHOCYTES # BLD AUTO: 1.7 K/UL — SIGNIFICANT CHANGE UP (ref 1–4.8)
LYMPHOCYTES # BLD AUTO: 30.5 % — SIGNIFICANT CHANGE UP (ref 20–55)
MAGNESIUM SERPL-MCNC: 2.1 MG/DL — SIGNIFICANT CHANGE UP (ref 1.6–2.6)
MCHC RBC-ENTMCNC: 27.9 PG — SIGNIFICANT CHANGE UP (ref 27–31)
MCHC RBC-ENTMCNC: 32.5 G/DL — SIGNIFICANT CHANGE UP (ref 32–36)
MCV RBC AUTO: 85.9 FL — SIGNIFICANT CHANGE UP (ref 81–99)
MONOCYTES # BLD AUTO: 0.4 K/UL — SIGNIFICANT CHANGE UP (ref 0–0.8)
MONOCYTES NFR BLD AUTO: 7.4 % — SIGNIFICANT CHANGE UP (ref 3–10)
NEUTROPHILS # BLD AUTO: 3.3 K/UL — SIGNIFICANT CHANGE UP (ref 1.8–8)
NEUTROPHILS NFR BLD AUTO: 58.9 % — SIGNIFICANT CHANGE UP (ref 37–73)
PHOSPHATE SERPL-MCNC: 3.6 MG/DL — SIGNIFICANT CHANGE UP (ref 2.4–4.7)
PLATELET # BLD AUTO: 223 K/UL — SIGNIFICANT CHANGE UP (ref 150–400)
POTASSIUM SERPL-MCNC: 3.2 MMOL/L — LOW (ref 3.5–5.3)
POTASSIUM SERPL-SCNC: 3.2 MMOL/L — LOW (ref 3.5–5.3)
RBC # BLD: 3.62 M/UL — LOW (ref 4.4–5.2)
RBC # FLD: 13.4 % — SIGNIFICANT CHANGE UP (ref 11–15.6)
SODIUM SERPL-SCNC: 139 MMOL/L — SIGNIFICANT CHANGE UP (ref 135–145)
VANCOMYCIN TROUGH SERPL-MCNC: 10.6 UG/ML — SIGNIFICANT CHANGE UP (ref 10–20)
WBC # BLD: 5.7 K/UL — SIGNIFICANT CHANGE UP (ref 4.8–10.8)
WBC # FLD AUTO: 5.7 K/UL — SIGNIFICANT CHANGE UP (ref 4.8–10.8)

## 2018-08-01 PROCEDURE — 99232 SBSQ HOSP IP/OBS MODERATE 35: CPT

## 2018-08-01 PROCEDURE — 99233 SBSQ HOSP IP/OBS HIGH 50: CPT

## 2018-08-01 RX ORDER — INSULIN GLARGINE 100 [IU]/ML
26 INJECTION, SOLUTION SUBCUTANEOUS AT BEDTIME
Qty: 0 | Refills: 0 | Status: DISCONTINUED | OUTPATIENT
Start: 2018-08-01 | End: 2018-08-04

## 2018-08-01 RX ORDER — POTASSIUM CHLORIDE 20 MEQ
40 PACKET (EA) ORAL EVERY 4 HOURS
Qty: 0 | Refills: 0 | Status: COMPLETED | OUTPATIENT
Start: 2018-08-01 | End: 2018-08-01

## 2018-08-01 RX ADMIN — CEFTRIAXONE 100 GRAM(S): 500 INJECTION, POWDER, FOR SOLUTION INTRAMUSCULAR; INTRAVENOUS at 05:37

## 2018-08-01 RX ADMIN — SODIUM CHLORIDE 150 MILLILITER(S): 9 INJECTION INTRAMUSCULAR; INTRAVENOUS; SUBCUTANEOUS at 18:26

## 2018-08-01 RX ADMIN — Medication 3: at 08:48

## 2018-08-01 RX ADMIN — Medication 250 MILLIGRAM(S): at 05:37

## 2018-08-01 RX ADMIN — Medication 40 MILLIEQUIVALENT(S): at 16:24

## 2018-08-01 RX ADMIN — Medication 1000 UNIT(S): at 18:24

## 2018-08-01 RX ADMIN — GABAPENTIN 600 MILLIGRAM(S): 400 CAPSULE ORAL at 05:37

## 2018-08-01 RX ADMIN — QUETIAPINE FUMARATE 150 MILLIGRAM(S): 200 TABLET, FILM COATED ORAL at 21:24

## 2018-08-01 RX ADMIN — SODIUM CHLORIDE 150 MILLILITER(S): 9 INJECTION INTRAMUSCULAR; INTRAVENOUS; SUBCUTANEOUS at 05:37

## 2018-08-01 RX ADMIN — SIMVASTATIN 20 MILLIGRAM(S): 20 TABLET, FILM COATED ORAL at 21:22

## 2018-08-01 RX ADMIN — BUDESONIDE AND FORMOTEROL FUMARATE DIHYDRATE 2 PUFF(S): 160; 4.5 AEROSOL RESPIRATORY (INHALATION) at 08:55

## 2018-08-01 RX ADMIN — Medication 250 MILLIGRAM(S): at 11:24

## 2018-08-01 RX ADMIN — OXYCODONE AND ACETAMINOPHEN 2 TABLET(S): 5; 325 TABLET ORAL at 05:26

## 2018-08-01 RX ADMIN — Medication 40 MILLIEQUIVALENT(S): at 21:22

## 2018-08-01 RX ADMIN — ENOXAPARIN SODIUM 40 MILLIGRAM(S): 100 INJECTION SUBCUTANEOUS at 11:24

## 2018-08-01 RX ADMIN — Medication 3: at 11:29

## 2018-08-01 RX ADMIN — Medication 5 UNIT(S): at 16:52

## 2018-08-01 RX ADMIN — OXYCODONE AND ACETAMINOPHEN 2 TABLET(S): 5; 325 TABLET ORAL at 18:59

## 2018-08-01 RX ADMIN — Medication 81 MILLIGRAM(S): at 11:24

## 2018-08-01 RX ADMIN — Medication 5 UNIT(S): at 11:29

## 2018-08-01 RX ADMIN — OXYCODONE AND ACETAMINOPHEN 2 TABLET(S): 5; 325 TABLET ORAL at 11:24

## 2018-08-01 RX ADMIN — Medication 50 GRAM(S): at 15:00

## 2018-08-01 RX ADMIN — Medication 2: at 16:52

## 2018-08-01 RX ADMIN — Medication 40 MILLIGRAM(S): at 18:21

## 2018-08-01 RX ADMIN — MAGNESIUM OXIDE 400 MG ORAL TABLET 400 MILLIGRAM(S): 241.3 TABLET ORAL at 16:53

## 2018-08-01 RX ADMIN — OXYCODONE AND ACETAMINOPHEN 2 TABLET(S): 5; 325 TABLET ORAL at 02:21

## 2018-08-01 RX ADMIN — Medication 5 UNIT(S): at 08:49

## 2018-08-01 RX ADMIN — METHOCARBAMOL 750 MILLIGRAM(S): 500 TABLET, FILM COATED ORAL at 05:37

## 2018-08-01 RX ADMIN — BUDESONIDE AND FORMOTEROL FUMARATE DIHYDRATE 2 PUFF(S): 160; 4.5 AEROSOL RESPIRATORY (INHALATION) at 20:45

## 2018-08-01 RX ADMIN — GABAPENTIN 600 MILLIGRAM(S): 400 CAPSULE ORAL at 18:21

## 2018-08-01 RX ADMIN — Medication 250 MILLIGRAM(S): at 18:21

## 2018-08-01 RX ADMIN — INSULIN GLARGINE 26 UNIT(S): 100 INJECTION, SOLUTION SUBCUTANEOUS at 21:23

## 2018-08-01 RX ADMIN — SODIUM CHLORIDE 150 MILLILITER(S): 9 INJECTION INTRAMUSCULAR; INTRAVENOUS; SUBCUTANEOUS at 16:24

## 2018-08-01 RX ADMIN — OXYCODONE AND ACETAMINOPHEN 2 TABLET(S): 5; 325 TABLET ORAL at 18:21

## 2018-08-01 RX ADMIN — Medication 250 MILLIGRAM(S): at 00:09

## 2018-08-01 RX ADMIN — OXYCODONE AND ACETAMINOPHEN 2 TABLET(S): 5; 325 TABLET ORAL at 12:14

## 2018-08-01 RX ADMIN — MAGNESIUM OXIDE 400 MG ORAL TABLET 400 MILLIGRAM(S): 241.3 TABLET ORAL at 13:44

## 2018-08-01 RX ADMIN — METHOCARBAMOL 750 MILLIGRAM(S): 500 TABLET, FILM COATED ORAL at 21:23

## 2018-08-01 NOTE — ED ADULT NURSE NOTE - NSPATIENTFLAG_GEN_A_ER
Quality 226: Preventive Care And Screening: Tobacco Use: Screening And Cessation Intervention: Patient screened for tobacco and never smoked Detail Level: Detailed Red X (Sepsis)

## 2018-08-02 LAB
ANION GAP SERPL CALC-SCNC: 9 MMOL/L — SIGNIFICANT CHANGE UP (ref 5–17)
ANISOCYTOSIS BLD QL: SLIGHT — SIGNIFICANT CHANGE UP
BASOPHILS NFR BLD AUTO: 1 % — SIGNIFICANT CHANGE UP (ref 0–2)
BUN SERPL-MCNC: 5 MG/DL — LOW (ref 8–20)
CALCIUM SERPL-MCNC: 8.4 MG/DL — LOW (ref 8.6–10.2)
CHLORIDE SERPL-SCNC: 105 MMOL/L — SIGNIFICANT CHANGE UP (ref 98–107)
CO2 SERPL-SCNC: 28 MMOL/L — SIGNIFICANT CHANGE UP (ref 22–29)
CREAT SERPL-MCNC: 0.51 MG/DL — SIGNIFICANT CHANGE UP (ref 0.5–1.3)
CRP SERPL-MCNC: 5.8 MG/DL — HIGH (ref 0–0.4)
EOSINOPHIL NFR BLD AUTO: 2 % — SIGNIFICANT CHANGE UP (ref 0–5)
ERYTHROCYTE [SEDIMENTATION RATE] IN BLOOD: 55 MM/HR — HIGH (ref 0–20)
GLUCOSE BLDC GLUCOMTR-MCNC: 130 MG/DL — HIGH (ref 70–99)
GLUCOSE BLDC GLUCOMTR-MCNC: 190 MG/DL — HIGH (ref 70–99)
GLUCOSE BLDC GLUCOMTR-MCNC: 246 MG/DL — HIGH (ref 70–99)
GLUCOSE BLDC GLUCOMTR-MCNC: 280 MG/DL — HIGH (ref 70–99)
GLUCOSE SERPL-MCNC: 158 MG/DL — HIGH (ref 70–115)
HCT VFR BLD CALC: 30.9 % — LOW (ref 37–47)
HGB BLD-MCNC: 9.9 G/DL — LOW (ref 12–16)
HYPOCHROMIA BLD QL: SLIGHT — SIGNIFICANT CHANGE UP
LYMPHOCYTES # BLD AUTO: 27 % — SIGNIFICANT CHANGE UP (ref 20–55)
MACROCYTES BLD QL: SLIGHT — SIGNIFICANT CHANGE UP
MAGNESIUM SERPL-MCNC: 1.7 MG/DL — SIGNIFICANT CHANGE UP (ref 1.6–2.6)
MCHC RBC-ENTMCNC: 27.7 PG — SIGNIFICANT CHANGE UP (ref 27–31)
MCHC RBC-ENTMCNC: 32 G/DL — SIGNIFICANT CHANGE UP (ref 32–36)
MCV RBC AUTO: 86.3 FL — SIGNIFICANT CHANGE UP (ref 81–99)
MICROCYTES BLD QL: SLIGHT — SIGNIFICANT CHANGE UP
MONOCYTES NFR BLD AUTO: 2 % — LOW (ref 3–10)
NEUTROPHILS NFR BLD AUTO: 68 % — SIGNIFICANT CHANGE UP (ref 37–73)
OVALOCYTES BLD QL SMEAR: SLIGHT — SIGNIFICANT CHANGE UP
PHOSPHATE SERPL-MCNC: 3.1 MG/DL — SIGNIFICANT CHANGE UP (ref 2.4–4.7)
PLAT MORPH BLD: NORMAL — SIGNIFICANT CHANGE UP
PLATELET # BLD AUTO: 290 K/UL — SIGNIFICANT CHANGE UP (ref 150–400)
POIKILOCYTOSIS BLD QL AUTO: SLIGHT — SIGNIFICANT CHANGE UP
POTASSIUM SERPL-MCNC: 4.1 MMOL/L — SIGNIFICANT CHANGE UP (ref 3.5–5.3)
POTASSIUM SERPL-SCNC: 4.1 MMOL/L — SIGNIFICANT CHANGE UP (ref 3.5–5.3)
RBC # BLD: 3.58 M/UL — LOW (ref 4.4–5.2)
RBC # FLD: 13.6 % — SIGNIFICANT CHANGE UP (ref 11–15.6)
RBC BLD AUTO: ABNORMAL
SODIUM SERPL-SCNC: 142 MMOL/L — SIGNIFICANT CHANGE UP (ref 135–145)
WBC # BLD: 7.2 K/UL — SIGNIFICANT CHANGE UP (ref 4.8–10.8)
WBC # FLD AUTO: 7.2 K/UL — SIGNIFICANT CHANGE UP (ref 4.8–10.8)

## 2018-08-02 PROCEDURE — 99232 SBSQ HOSP IP/OBS MODERATE 35: CPT

## 2018-08-02 PROCEDURE — 99233 SBSQ HOSP IP/OBS HIGH 50: CPT

## 2018-08-02 RX ORDER — DIAZEPAM 5 MG
2 TABLET ORAL ONCE
Qty: 0 | Refills: 0 | Status: DISCONTINUED | OUTPATIENT
Start: 2018-08-02 | End: 2018-08-03

## 2018-08-02 RX ADMIN — Medication 81 MILLIGRAM(S): at 13:12

## 2018-08-02 RX ADMIN — Medication 15 MILLIGRAM(S): at 13:01

## 2018-08-02 RX ADMIN — BUDESONIDE AND FORMOTEROL FUMARATE DIHYDRATE 2 PUFF(S): 160; 4.5 AEROSOL RESPIRATORY (INHALATION) at 08:59

## 2018-08-02 RX ADMIN — GABAPENTIN 600 MILLIGRAM(S): 400 CAPSULE ORAL at 05:54

## 2018-08-02 RX ADMIN — Medication 5 UNIT(S): at 13:02

## 2018-08-02 RX ADMIN — MAGNESIUM OXIDE 400 MG ORAL TABLET 400 MILLIGRAM(S): 241.3 TABLET ORAL at 17:45

## 2018-08-02 RX ADMIN — OXYCODONE AND ACETAMINOPHEN 2 TABLET(S): 5; 325 TABLET ORAL at 05:45

## 2018-08-02 RX ADMIN — Medication 250 MILLIGRAM(S): at 17:45

## 2018-08-02 RX ADMIN — OXYCODONE AND ACETAMINOPHEN 2 TABLET(S): 5; 325 TABLET ORAL at 00:27

## 2018-08-02 RX ADMIN — CEFTRIAXONE 100 GRAM(S): 500 INJECTION, POWDER, FOR SOLUTION INTRAMUSCULAR; INTRAVENOUS at 10:34

## 2018-08-02 RX ADMIN — Medication 250 MILLIGRAM(S): at 05:47

## 2018-08-02 RX ADMIN — OXYCODONE AND ACETAMINOPHEN 2 TABLET(S): 5; 325 TABLET ORAL at 14:57

## 2018-08-02 RX ADMIN — BUDESONIDE AND FORMOTEROL FUMARATE DIHYDRATE 2 PUFF(S): 160; 4.5 AEROSOL RESPIRATORY (INHALATION) at 20:32

## 2018-08-02 RX ADMIN — Medication 1: at 13:02

## 2018-08-02 RX ADMIN — MAGNESIUM OXIDE 400 MG ORAL TABLET 400 MILLIGRAM(S): 241.3 TABLET ORAL at 13:06

## 2018-08-02 RX ADMIN — OXYCODONE AND ACETAMINOPHEN 2 TABLET(S): 5; 325 TABLET ORAL at 13:57

## 2018-08-02 RX ADMIN — GABAPENTIN 600 MILLIGRAM(S): 400 CAPSULE ORAL at 17:44

## 2018-08-02 RX ADMIN — OXYCODONE AND ACETAMINOPHEN 2 TABLET(S): 5; 325 TABLET ORAL at 07:45

## 2018-08-02 RX ADMIN — INSULIN GLARGINE 26 UNIT(S): 100 INJECTION, SOLUTION SUBCUTANEOUS at 22:51

## 2018-08-02 RX ADMIN — ENOXAPARIN SODIUM 40 MILLIGRAM(S): 100 INJECTION SUBCUTANEOUS at 13:12

## 2018-08-02 RX ADMIN — OXYCODONE AND ACETAMINOPHEN 2 TABLET(S): 5; 325 TABLET ORAL at 19:55

## 2018-08-02 RX ADMIN — Medication 1000 UNIT(S): at 13:05

## 2018-08-02 RX ADMIN — SODIUM CHLORIDE 150 MILLILITER(S): 9 INJECTION INTRAMUSCULAR; INTRAVENOUS; SUBCUTANEOUS at 17:45

## 2018-08-02 RX ADMIN — MAGNESIUM OXIDE 400 MG ORAL TABLET 400 MILLIGRAM(S): 241.3 TABLET ORAL at 08:36

## 2018-08-02 RX ADMIN — Medication 5 UNIT(S): at 17:43

## 2018-08-02 RX ADMIN — Medication 40 MILLIGRAM(S): at 13:05

## 2018-08-02 RX ADMIN — METHOCARBAMOL 750 MILLIGRAM(S): 500 TABLET, FILM COATED ORAL at 05:47

## 2018-08-02 RX ADMIN — Medication 15 MILLIGRAM(S): at 13:16

## 2018-08-02 RX ADMIN — OXYCODONE AND ACETAMINOPHEN 2 TABLET(S): 5; 325 TABLET ORAL at 20:21

## 2018-08-02 RX ADMIN — SIMVASTATIN 20 MILLIGRAM(S): 20 TABLET, FILM COATED ORAL at 22:51

## 2018-08-02 RX ADMIN — QUETIAPINE FUMARATE 150 MILLIGRAM(S): 200 TABLET, FILM COATED ORAL at 22:51

## 2018-08-02 RX ADMIN — METHOCARBAMOL 750 MILLIGRAM(S): 500 TABLET, FILM COATED ORAL at 17:44

## 2018-08-02 RX ADMIN — Medication 5 UNIT(S): at 08:36

## 2018-08-02 RX ADMIN — Medication 50 GRAM(S): at 13:13

## 2018-08-02 RX ADMIN — OXYCODONE AND ACETAMINOPHEN 2 TABLET(S): 5; 325 TABLET ORAL at 08:45

## 2018-08-02 RX ADMIN — Medication 2: at 17:43
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
## 2018-08-03 ENCOUNTER — APPOINTMENT (OUTPATIENT)
Dept: ORTHOPEDIC SURGERY | Facility: CLINIC | Age: 58
End: 2018-08-03

## 2018-08-03 LAB
ANION GAP SERPL CALC-SCNC: 12 MMOL/L — SIGNIFICANT CHANGE UP (ref 5–17)
BASOPHILS # BLD AUTO: 0 K/UL — SIGNIFICANT CHANGE UP (ref 0–0.2)
BASOPHILS NFR BLD AUTO: 0.3 % — SIGNIFICANT CHANGE UP (ref 0–2)
BUN SERPL-MCNC: 7 MG/DL — LOW (ref 8–20)
CALCIUM SERPL-MCNC: 8.7 MG/DL — SIGNIFICANT CHANGE UP (ref 8.6–10.2)
CHLORIDE SERPL-SCNC: 101 MMOL/L — SIGNIFICANT CHANGE UP (ref 98–107)
CO2 SERPL-SCNC: 28 MMOL/L — SIGNIFICANT CHANGE UP (ref 22–29)
CREAT SERPL-MCNC: 0.45 MG/DL — LOW (ref 0.5–1.3)
CULTURE RESULTS: SIGNIFICANT CHANGE UP
CULTURE RESULTS: SIGNIFICANT CHANGE UP
EOSINOPHIL # BLD AUTO: 0.2 K/UL — SIGNIFICANT CHANGE UP (ref 0–0.5)
EOSINOPHIL NFR BLD AUTO: 2.1 % — SIGNIFICANT CHANGE UP (ref 0–6)
GLUCOSE BLDC GLUCOMTR-MCNC: 151 MG/DL — HIGH (ref 70–99)
GLUCOSE BLDC GLUCOMTR-MCNC: 168 MG/DL — HIGH (ref 70–99)
GLUCOSE BLDC GLUCOMTR-MCNC: 242 MG/DL — HIGH (ref 70–99)
GLUCOSE SERPL-MCNC: 203 MG/DL — HIGH (ref 70–115)
HCT VFR BLD CALC: 31 % — LOW (ref 37–47)
HGB BLD-MCNC: 9.9 G/DL — LOW (ref 12–16)
LYMPHOCYTES # BLD AUTO: 1.9 K/UL — SIGNIFICANT CHANGE UP (ref 1–4.8)
LYMPHOCYTES # BLD AUTO: 26.7 % — SIGNIFICANT CHANGE UP (ref 20–55)
MAGNESIUM SERPL-MCNC: 1.9 MG/DL — SIGNIFICANT CHANGE UP (ref 1.6–2.6)
MCHC RBC-ENTMCNC: 27.7 PG — SIGNIFICANT CHANGE UP (ref 27–31)
MCHC RBC-ENTMCNC: 31.9 G/DL — LOW (ref 32–36)
MCV RBC AUTO: 86.6 FL — SIGNIFICANT CHANGE UP (ref 81–99)
MONOCYTES # BLD AUTO: 0.5 K/UL — SIGNIFICANT CHANGE UP (ref 0–0.8)
MONOCYTES NFR BLD AUTO: 7.3 % — SIGNIFICANT CHANGE UP (ref 3–10)
NEUTROPHILS # BLD AUTO: 4.5 K/UL — SIGNIFICANT CHANGE UP (ref 1.8–8)
NEUTROPHILS NFR BLD AUTO: 63 % — SIGNIFICANT CHANGE UP (ref 37–73)
PHOSPHATE SERPL-MCNC: 3.2 MG/DL — SIGNIFICANT CHANGE UP (ref 2.4–4.7)
PLATELET # BLD AUTO: 305 K/UL — SIGNIFICANT CHANGE UP (ref 150–400)
POTASSIUM SERPL-MCNC: 3.9 MMOL/L — SIGNIFICANT CHANGE UP (ref 3.5–5.3)
POTASSIUM SERPL-SCNC: 3.9 MMOL/L — SIGNIFICANT CHANGE UP (ref 3.5–5.3)
RBC # BLD: 3.58 M/UL — LOW (ref 4.4–5.2)
RBC # FLD: 13.5 % — SIGNIFICANT CHANGE UP (ref 11–15.6)
SODIUM SERPL-SCNC: 141 MMOL/L — SIGNIFICANT CHANGE UP (ref 135–145)
SPECIMEN SOURCE: SIGNIFICANT CHANGE UP
SPECIMEN SOURCE: SIGNIFICANT CHANGE UP
WBC # BLD: 7.1 K/UL — SIGNIFICANT CHANGE UP (ref 4.8–10.8)
WBC # FLD AUTO: 7.1 K/UL — SIGNIFICANT CHANGE UP (ref 4.8–10.8)

## 2018-08-03 PROCEDURE — 99232 SBSQ HOSP IP/OBS MODERATE 35: CPT

## 2018-08-03 PROCEDURE — 99223 1ST HOSP IP/OBS HIGH 75: CPT

## 2018-08-03 RX ORDER — CEFPODOXIME PROXETIL 100 MG
200 TABLET ORAL EVERY 12 HOURS
Qty: 0 | Refills: 0 | Status: DISCONTINUED | OUTPATIENT
Start: 2018-08-04 | End: 2018-08-04

## 2018-08-03 RX ADMIN — QUETIAPINE FUMARATE 150 MILLIGRAM(S): 200 TABLET, FILM COATED ORAL at 23:15

## 2018-08-03 RX ADMIN — Medication 1: at 16:26

## 2018-08-03 RX ADMIN — METHOCARBAMOL 750 MILLIGRAM(S): 500 TABLET, FILM COATED ORAL at 18:46

## 2018-08-03 RX ADMIN — MAGNESIUM OXIDE 400 MG ORAL TABLET 400 MILLIGRAM(S): 241.3 TABLET ORAL at 08:50

## 2018-08-03 RX ADMIN — Medication 1: at 08:31

## 2018-08-03 RX ADMIN — OXYCODONE AND ACETAMINOPHEN 2 TABLET(S): 5; 325 TABLET ORAL at 12:45

## 2018-08-03 RX ADMIN — ENOXAPARIN SODIUM 40 MILLIGRAM(S): 100 INJECTION SUBCUTANEOUS at 11:44

## 2018-08-03 RX ADMIN — OXYCODONE AND ACETAMINOPHEN 2 TABLET(S): 5; 325 TABLET ORAL at 11:44

## 2018-08-03 RX ADMIN — Medication 5 UNIT(S): at 08:32

## 2018-08-03 RX ADMIN — Medication 50 GRAM(S): at 11:45

## 2018-08-03 RX ADMIN — SODIUM CHLORIDE 150 MILLILITER(S): 9 INJECTION INTRAMUSCULAR; INTRAVENOUS; SUBCUTANEOUS at 20:18

## 2018-08-03 RX ADMIN — INSULIN GLARGINE 26 UNIT(S): 100 INJECTION, SOLUTION SUBCUTANEOUS at 23:15

## 2018-08-03 RX ADMIN — MAGNESIUM OXIDE 400 MG ORAL TABLET 400 MILLIGRAM(S): 241.3 TABLET ORAL at 17:35

## 2018-08-03 RX ADMIN — Medication 40 MILLIGRAM(S): at 11:44

## 2018-08-03 RX ADMIN — GABAPENTIN 600 MILLIGRAM(S): 400 CAPSULE ORAL at 05:11

## 2018-08-03 RX ADMIN — SODIUM CHLORIDE 150 MILLILITER(S): 9 INJECTION INTRAMUSCULAR; INTRAVENOUS; SUBCUTANEOUS at 20:13

## 2018-08-03 RX ADMIN — METHOCARBAMOL 750 MILLIGRAM(S): 500 TABLET, FILM COATED ORAL at 05:11

## 2018-08-03 RX ADMIN — Medication 81 MILLIGRAM(S): at 11:45

## 2018-08-03 RX ADMIN — Medication 1000 UNIT(S): at 11:44

## 2018-08-03 RX ADMIN — OXYCODONE AND ACETAMINOPHEN 2 TABLET(S): 5; 325 TABLET ORAL at 05:11

## 2018-08-03 RX ADMIN — BUDESONIDE AND FORMOTEROL FUMARATE DIHYDRATE 2 PUFF(S): 160; 4.5 AEROSOL RESPIRATORY (INHALATION) at 10:30

## 2018-08-03 RX ADMIN — Medication 5 UNIT(S): at 16:27

## 2018-08-03 RX ADMIN — Medication 15 MILLIGRAM(S): at 17:00

## 2018-08-03 RX ADMIN — BUDESONIDE AND FORMOTEROL FUMARATE DIHYDRATE 2 PUFF(S): 160; 4.5 AEROSOL RESPIRATORY (INHALATION) at 20:39

## 2018-08-03 RX ADMIN — SIMVASTATIN 20 MILLIGRAM(S): 20 TABLET, FILM COATED ORAL at 23:14

## 2018-08-03 RX ADMIN — SODIUM CHLORIDE 150 MILLILITER(S): 9 INJECTION INTRAMUSCULAR; INTRAVENOUS; SUBCUTANEOUS at 16:28

## 2018-08-03 RX ADMIN — Medication 15 MILLIGRAM(S): at 16:27

## 2018-08-03 RX ADMIN — Medication 250 MILLIGRAM(S): at 17:36

## 2018-08-03 RX ADMIN — Medication 5 UNIT(S): at 11:49

## 2018-08-03 RX ADMIN — MAGNESIUM OXIDE 400 MG ORAL TABLET 400 MILLIGRAM(S): 241.3 TABLET ORAL at 11:45

## 2018-08-03 RX ADMIN — Medication 250 MILLIGRAM(S): at 05:11

## 2018-08-03 RX ADMIN — OXYCODONE AND ACETAMINOPHEN 2 TABLET(S): 5; 325 TABLET ORAL at 20:09

## 2018-08-03 RX ADMIN — CEFTRIAXONE 100 GRAM(S): 500 INJECTION, POWDER, FOR SOLUTION INTRAMUSCULAR; INTRAVENOUS at 05:10

## 2018-08-03 RX ADMIN — OXYCODONE AND ACETAMINOPHEN 2 TABLET(S): 5; 325 TABLET ORAL at 20:39

## 2018-08-03 RX ADMIN — OXYCODONE AND ACETAMINOPHEN 2 TABLET(S): 5; 325 TABLET ORAL at 05:48

## 2018-08-03 RX ADMIN — GABAPENTIN 600 MILLIGRAM(S): 400 CAPSULE ORAL at 17:35

## 2018-08-03 RX ADMIN — Medication 2: at 11:49

## 2018-08-04 ENCOUNTER — TRANSCRIPTION ENCOUNTER (OUTPATIENT)
Age: 58
End: 2018-08-04

## 2018-08-04 VITALS — HEART RATE: 71 BPM | DIASTOLIC BLOOD PRESSURE: 70 MMHG | SYSTOLIC BLOOD PRESSURE: 115 MMHG | TEMPERATURE: 98 F

## 2018-08-04 LAB
ANION GAP SERPL CALC-SCNC: 12 MMOL/L — SIGNIFICANT CHANGE UP (ref 5–17)
BASOPHILS # BLD AUTO: 0 K/UL — SIGNIFICANT CHANGE UP (ref 0–0.2)
BASOPHILS NFR BLD AUTO: 0.3 % — SIGNIFICANT CHANGE UP (ref 0–2)
BUN SERPL-MCNC: 8 MG/DL — SIGNIFICANT CHANGE UP (ref 8–20)
CALCIUM SERPL-MCNC: 9.1 MG/DL — SIGNIFICANT CHANGE UP (ref 8.6–10.2)
CHLORIDE SERPL-SCNC: 101 MMOL/L — SIGNIFICANT CHANGE UP (ref 98–107)
CO2 SERPL-SCNC: 29 MMOL/L — SIGNIFICANT CHANGE UP (ref 22–29)
CREAT SERPL-MCNC: 0.55 MG/DL — SIGNIFICANT CHANGE UP (ref 0.5–1.3)
CULTURE RESULTS: SIGNIFICANT CHANGE UP
CULTURE RESULTS: SIGNIFICANT CHANGE UP
EOSINOPHIL # BLD AUTO: 0.2 K/UL — SIGNIFICANT CHANGE UP (ref 0–0.5)
EOSINOPHIL NFR BLD AUTO: 2.7 % — SIGNIFICANT CHANGE UP (ref 0–6)
GLUCOSE BLDC GLUCOMTR-MCNC: 172 MG/DL — HIGH (ref 70–99)
GLUCOSE BLDC GLUCOMTR-MCNC: 172 MG/DL — HIGH (ref 70–99)
GLUCOSE BLDC GLUCOMTR-MCNC: 198 MG/DL — HIGH (ref 70–99)
GLUCOSE SERPL-MCNC: 244 MG/DL — HIGH (ref 70–115)
HCT VFR BLD CALC: 31.7 % — LOW (ref 37–47)
HGB BLD-MCNC: 10.1 G/DL — LOW (ref 12–16)
LYMPHOCYTES # BLD AUTO: 2.1 K/UL — SIGNIFICANT CHANGE UP (ref 1–4.8)
LYMPHOCYTES # BLD AUTO: 28.1 % — SIGNIFICANT CHANGE UP (ref 20–55)
MAGNESIUM SERPL-MCNC: 2 MG/DL — SIGNIFICANT CHANGE UP (ref 1.6–2.6)
MCHC RBC-ENTMCNC: 28 PG — SIGNIFICANT CHANGE UP (ref 27–31)
MCHC RBC-ENTMCNC: 31.9 G/DL — LOW (ref 32–36)
MCV RBC AUTO: 87.8 FL — SIGNIFICANT CHANGE UP (ref 81–99)
MONOCYTES # BLD AUTO: 0.6 K/UL — SIGNIFICANT CHANGE UP (ref 0–0.8)
MONOCYTES NFR BLD AUTO: 7.7 % — SIGNIFICANT CHANGE UP (ref 3–10)
NEUTROPHILS # BLD AUTO: 4.5 K/UL — SIGNIFICANT CHANGE UP (ref 1.8–8)
NEUTROPHILS NFR BLD AUTO: 59.9 % — SIGNIFICANT CHANGE UP (ref 37–73)
PHOSPHATE SERPL-MCNC: 4.1 MG/DL — SIGNIFICANT CHANGE UP (ref 2.4–4.7)
PLATELET # BLD AUTO: 341 K/UL — SIGNIFICANT CHANGE UP (ref 150–400)
POTASSIUM SERPL-MCNC: 5 MMOL/L — SIGNIFICANT CHANGE UP (ref 3.5–5.3)
POTASSIUM SERPL-SCNC: 5 MMOL/L — SIGNIFICANT CHANGE UP (ref 3.5–5.3)
RBC # BLD: 3.61 M/UL — LOW (ref 4.4–5.2)
RBC # FLD: 13.8 % — SIGNIFICANT CHANGE UP (ref 11–15.6)
SODIUM SERPL-SCNC: 142 MMOL/L — SIGNIFICANT CHANGE UP (ref 135–145)
SPECIMEN SOURCE: SIGNIFICANT CHANGE UP
SPECIMEN SOURCE: SIGNIFICANT CHANGE UP
WBC # BLD: 7.5 K/UL — SIGNIFICANT CHANGE UP (ref 4.8–10.8)
WBC # FLD AUTO: 7.5 K/UL — SIGNIFICANT CHANGE UP (ref 4.8–10.8)

## 2018-08-04 PROCEDURE — 99239 HOSP IP/OBS DSCHRG MGMT >30: CPT

## 2018-08-04 RX ORDER — SACCHAROMYCES BOULARDII 250 MG
1 POWDER IN PACKET (EA) ORAL
Qty: 10 | Refills: 0 | OUTPATIENT
Start: 2018-08-04 | End: 2018-08-08

## 2018-08-04 RX ORDER — CEFPODOXIME PROXETIL 100 MG
1 TABLET ORAL
Qty: 6 | Refills: 0 | OUTPATIENT
Start: 2018-08-04

## 2018-08-04 RX ADMIN — Medication 200 MILLIGRAM(S): at 16:42

## 2018-08-04 RX ADMIN — Medication 5 UNIT(S): at 16:40

## 2018-08-04 RX ADMIN — OXYCODONE AND ACETAMINOPHEN 2 TABLET(S): 5; 325 TABLET ORAL at 10:00

## 2018-08-04 RX ADMIN — Medication 250 MILLIGRAM(S): at 16:45

## 2018-08-04 RX ADMIN — Medication 1: at 16:40

## 2018-08-04 RX ADMIN — OXYCODONE AND ACETAMINOPHEN 2 TABLET(S): 5; 325 TABLET ORAL at 16:46

## 2018-08-04 RX ADMIN — MAGNESIUM OXIDE 400 MG ORAL TABLET 400 MILLIGRAM(S): 241.3 TABLET ORAL at 16:39

## 2018-08-04 RX ADMIN — Medication 1: at 08:47

## 2018-08-04 RX ADMIN — Medication 1000 UNIT(S): at 12:06

## 2018-08-04 RX ADMIN — METHOCARBAMOL 750 MILLIGRAM(S): 500 TABLET, FILM COATED ORAL at 16:44

## 2018-08-04 RX ADMIN — Medication 1: at 12:09

## 2018-08-04 RX ADMIN — METHOCARBAMOL 750 MILLIGRAM(S): 500 TABLET, FILM COATED ORAL at 05:25

## 2018-08-04 RX ADMIN — GABAPENTIN 600 MILLIGRAM(S): 400 CAPSULE ORAL at 05:26

## 2018-08-04 RX ADMIN — Medication 200 MILLIGRAM(S): at 05:25

## 2018-08-04 RX ADMIN — MAGNESIUM OXIDE 400 MG ORAL TABLET 400 MILLIGRAM(S): 241.3 TABLET ORAL at 12:07

## 2018-08-04 RX ADMIN — Medication 81 MILLIGRAM(S): at 12:05

## 2018-08-04 RX ADMIN — SODIUM CHLORIDE 150 MILLILITER(S): 9 INJECTION INTRAMUSCULAR; INTRAVENOUS; SUBCUTANEOUS at 05:27

## 2018-08-04 RX ADMIN — Medication 5 UNIT(S): at 12:09

## 2018-08-04 RX ADMIN — ENOXAPARIN SODIUM 40 MILLIGRAM(S): 100 INJECTION SUBCUTANEOUS at 12:06

## 2018-08-04 RX ADMIN — OXYCODONE AND ACETAMINOPHEN 2 TABLET(S): 5; 325 TABLET ORAL at 02:53

## 2018-08-04 RX ADMIN — OXYCODONE AND ACETAMINOPHEN 2 TABLET(S): 5; 325 TABLET ORAL at 15:21

## 2018-08-04 RX ADMIN — OXYCODONE AND ACETAMINOPHEN 2 TABLET(S): 5; 325 TABLET ORAL at 02:22

## 2018-08-04 RX ADMIN — Medication 50 GRAM(S): at 13:01

## 2018-08-04 RX ADMIN — GABAPENTIN 600 MILLIGRAM(S): 400 CAPSULE ORAL at 16:42

## 2018-08-04 RX ADMIN — Medication 40 MILLIGRAM(S): at 12:08

## 2018-08-04 RX ADMIN — Medication 250 MILLIGRAM(S): at 05:25

## 2018-08-04 RX ADMIN — Medication 5 UNIT(S): at 08:46

## 2018-08-04 RX ADMIN — MAGNESIUM OXIDE 400 MG ORAL TABLET 400 MILLIGRAM(S): 241.3 TABLET ORAL at 08:49

## 2018-08-04 RX ADMIN — BUDESONIDE AND FORMOTEROL FUMARATE DIHYDRATE 2 PUFF(S): 160; 4.5 AEROSOL RESPIRATORY (INHALATION) at 08:31

## 2018-08-04 RX ADMIN — OXYCODONE AND ACETAMINOPHEN 2 TABLET(S): 5; 325 TABLET ORAL at 09:00

## 2018-08-04 NOTE — DISCHARGE NOTE ADULT - MEDICATION SUMMARY - MEDICATIONS TO STOP TAKING
I will STOP taking the medications listed below when I get home from the hospital:    lisinopril 2.5 mg oral tablet  -- 1 tab(s) by mouth once a day

## 2018-08-04 NOTE — DISCHARGE NOTE ADULT - PATIENT PORTAL LINK FT
You can access the [facility] Portal, offered by [facility], by registering with the following website: [email or web address]

## 2018-08-04 NOTE — PROGRESS NOTE ADULT - PROVIDER SPECIALTY LIST ADULT
Hospitalist
Infectious Disease
Orthopedics
Hospitalist
Infectious Disease

## 2018-08-04 NOTE — PROGRESS NOTE ADULT - SUBJECTIVE AND OBJECTIVE BOX
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          RECENT CULTURES:  07-30 @ 09:18 .Urine Clean Catch (Midstream)     Culture grew 3 or more types of organisms which indicate  collection contamination; consider recollection only if clinically  indicated.        07-29 @ 20:35 .Urine Clean Catch (Midstream)     No growth
There is no signs of epidural abscess the MRI can be cancelled.  Remind a Pain Management Consult if need.  No Surgical interventions required at this time.

## 2018-08-04 NOTE — DISCHARGE NOTE ADULT - CARE PROVIDER_API CALL
Oskar Arriaga (MD), Urology  [address] [place] [address]  Phone: [phone]  Fax: [phone]    Jonathon Cevallos (), Orthopaedic Surgery  [address] [place] [address]  Phone: [phone]  Fax: [phone]

## 2018-08-04 NOTE — DISCHARGE NOTE ADULT - CARE PLAN
Principal Discharge DX:	Pyelonephritis  Goal:	Stable for discharge  Assessment and plan of treatment:	It is important to see your primary physician as well as the physicians noted below within the next week to perform a comprehensive medical review.  Call their offices for an appointment as soon as you leave the hospital.  If you do not have a primary physician, contact the [facility] [place] [phone] located on [address] [place] [address], [place] [address].  Your medical issues appear to be stable at this time, but if your symptoms recur or worsen, contact your physicians and/or return to the hospital if necessary.  If you encounter any issues or questions with your medication, call your physicians before stopping the medication.  Do not drive.  Diabetic diet.  Secondary Diagnosis:	Sepsis, due to unspecified organism  Secondary Diagnosis:	Type 2 diabetes mellitus with diabetic polyneuropathy, with long-term current use of insulin  Secondary Diagnosis:	Depression, unspecified depression type  Secondary Diagnosis:	Bipolar affective disorder, remission status unspecified

## 2018-08-04 NOTE — DISCHARGE NOTE ADULT - HOSPITAL COURSE
Patient: GEREMIAS PICHARDO [id number]                 Internal Medicine Hospitalist Discharge Note    Chief Complaint: Patient is a 57y old  Female who presents with a chief complaint of fever, chills (30 Jul 2018 12:53)    HPI:  57 year old female with a PMHx of Recurrent UTIs (has duplicated right collecting system), DM2, HLD, Bipolar Disorder, Depression and Anxiety, asthma and PTSD presenting to the ED with chills, fever of 101, right sided flank pain radiating to groin, mild dysuria.  Patient was seen at [facility] earlier this morning regarding same sx and had a UA which was negative for infection, CT workup showing questionable mass on the left kidney.  She was given sx treatment and DM management and discharged home.  Patient states she went home and the chills & flank pain worsened so she came right back.  Denies any chest pain, palpitations, sob, light headedness/dizziness, difficulty breathing/cough,  n/v, diarrhea/constipation, increased urinary frequency.   Did have 1 episode of diarrhea (non bloody) today.  No recent travel or sick contacts or recent abx.  Patient states similar sx in past due to a UTI. Pt was recently discharged from [facility] on 4/27 after being treated for a UA -ve but culture positive pyelonephritis. She was being followed by Dr. Arriaga who had recommended prophylactic macrobid she was stopped by him some time back. Of note, pt has an epidural injection by pain management 1 month ago but does not report increased pain at that site. Pt had a temp of 104.6 on arrival. UA -ve.  Blood sugar on arrival was 500+, no DKA, now down to 277 now after insulin. (30 Jul 2018 12:53)    Treated for presumed pyelonephritis.  Renal Ultrasound showed no hydronephrosis.  MRI L spine with contrast showed b/l perifacet enhancement.  Seen by ortho, recommended outpatient followup.  Pt denies complaints.  no pain.  No fever / chills.  No additional complaints.     MRI L spine with contrast: "Nonspecific faint bilateral ida-facet enhancement at the L4/L5 level. Differential diagnosis includes infectious and inflammatory etiologies. No drainable fluid collection. No adjacent osseous enhancement. Tiny right sided enhanced L4/L5 L5 and/S1 facet effusions. The effusions likely secondary to degenerative changes.  Multilevel spondylotic changes as described above.  No definite evidence of abscess.  Additional nonemergent findings as described above."        	  >Sepsis due to Clinical Pyelonephritis - discussed with ID.  Change to po antibiotics.  Complete 3 more days of po Vantin.   > L4-5 enhancement - Pts symptoms do not appear to correlate with L4-5 enhancement.  No abscess visualized.  Discussed with spine Dr. Cevallos.  Pt counselled on need for outpatient followup.    >Recurrent UTIs (has duplicated right collecting system)- Urology input appreciated. Outpatient followup.   >Uncontrolled DM2- Increase Lantus, Continue Insulin coverage.  FS stable.  Pt to resume outpatient regimen, f/u with PMD.  >Bipolar Disorder- c/w seroquel  >Depression and Anxiety- c/w prozac  >asthma - c/w inhalers    Disposition: stable for discharge.  Outpatient followup as above.  Patient was advised to return if they experience any recurrence or worsening of symptoms.  Total time spent on discharge was 35 minutes.  -Jeison Gayle D.O., Hospitalist, [facility]

## 2018-08-04 NOTE — DISCHARGE NOTE ADULT - CARE PROVIDERS DIRECT ADDRESSES
,DirectAddress_Unknown,[email or web address]

## 2018-08-04 NOTE — PROGRESS NOTE ADULT - ASSESSMENT
57 year old female with a PMHx of Recurrent UTIs (has duplicated right collecting system), DM2, HLD, Bipolar Disorder, Depression and Anxiety, asthma and PTSD presenting to the ED with chills, fever of 101, right sided flank pain radiating to groin, mild dysuria.  Patient was seen at [facility] earlier this morning regarding same sx and had a UA which was negative for infection, CT workup showing questionable mass on the left kidney.  She was given sx treatment and DM management and discharged home.  Patient states she went home and the chills & flank pain worsened so she came right back.  Denies any chest pain, palpitations, sob, light headedness/dizziness, difficulty breathing/cough,  n/v, diarrhea/constipation, increased urinary frequency.   Did have 1 episode of diarrhea (non bloody) today.  No recent travel or sick contacts or recent abx.  Patient states similar sx in past due to a UTI. Pt was recently discharged from [facility] on 4/27 after being treated for KLEBSIELLA  urine cx  with three organsims   BLOOD CX NEG  UNCLEAR IF PYELO  IMPROVING ON ABX  OK TO JENNIFER TO PO VANTIN WOULD COMPLETE TOTAL AT LEAST 7 DAYS  ORTHO   EVAL noted   WILL FOLLOW UP AS NEEDED PLEASE CALL IF QUESTIONS
>Sepsis due to Clinical Pyelonephritis - discussed with ID.  Change to po antibiotics.  Complete 3 more days of po Vantin.   > L4-5 enhancement - Pts symptoms do not appear to correlate with L4-5 enhancement.  No abscess visualized.  Discussed with spine Dr. Cevallos.  Pt counselled on need for outpatient followup.    >Recurrent UTIs (has duplicated right collecting system)- Urology input appreciated. Outpatient followup.   >Uncontrolled DM2- Increase Lantus, Continue Insulin coverage.  FS stable.  Pt to resume outpatient regimen, f/u with PMD.  >Bipolar Disorder- c/w seroquel  >Depression and Anxiety- c/w prozac  >asthma - c/w inhalers  > DVT Prophylaxis - Lovenox subcut    [place].  Pt recently filled Rx for Percocet 7/17.
57 year old female with a PMHx of Recurrent UTIs (has duplicated right collecting system), DM2, HLD, Bipolar Disorder, Depression and Anxiety, asthma and PTSD presenting to the ED with chills, fever of 101, right sided flank pain radiating to groin, mild dysuria.  Patient was seen at [facility] earlier this morning regarding same sx and had a UA which was negative for infection, CT workup showing questionable mass on the left kidney.  She was given sx treatment and DM management and discharged home.  Patient states she went home and the chills & flank pain worsened so she came right back.  Denies any chest pain, palpitations, sob, light headedness/dizziness, difficulty breathing/cough,  n/v, diarrhea/constipation, increased urinary frequency.   Did have 1 episode of diarrhea (non bloody) today.  No recent travel or sick contacts or recent abx.  Patient states similar sx in past due to a UTI. Pt was recently discharged from [facility] on 4/27 after being treated for KLEBSIELLA  PT WITH FEVER LAST PM     CONSIDER UROLOGY EVALUATION   WILL FOLLOWUP BLOOD AND URINE CX  WILL FOLLOW UP
57 year old female with a PMHx of Recurrent UTIs (has duplicated right collecting system), DM2, HLD, Bipolar Disorder, Depression and Anxiety, asthma and PTSD presenting to the ED with chills, fever of 101, right sided flank pain radiating to groin, mild dysuria.  Patient was seen at [facility] earlier this morning regarding same sx and had a UA which was negative for infection, CT workup showing questionable mass on the left kidney.  She was given sx treatment and DM management and discharged home.  Patient states she went home and the chills & flank pain worsened so she came right back.  Denies any chest pain, palpitations, sob, light headedness/dizziness, difficulty breathing/cough,  n/v, diarrhea/constipation, increased urinary frequency.   Did have 1 episode of diarrhea (non bloody) today.  No recent travel or sick contacts or recent abx.  Patient states similar sx in past due to a UTI. Pt was recently discharged from [facility] on 4/27 after being treated for KLEBSIELLA  urine cx  with three organsims   BLOOD CX NEG  UNCLEAR IF PYELO  WILL D/W HOSPITALIST  WILL D/C BEN
57 year old female with a PMHx of Recurrent UTIs (has duplicated right collecting system), DM2, HLD, Bipolar Disorder, Depression and Anxiety, asthma and PTSD presenting to the ED with chills, fever of 101, right sided flank pain radiating to groin, mild dysuria.  Patient was seen at [facility] earlier this morning regarding same sx and had a UA which was negative for infection, CT workup showing questionable mass on the left kidney.  She was given sx treatment and DM management and discharged home.  Patient states she went home and the chills & flank pain worsened so she came right back.  Denies any chest pain, palpitations, sob, light headedness/dizziness, difficulty breathing/cough,  n/v, diarrhea/constipation, increased urinary frequency.   Did have 1 episode of diarrhea (non bloody) today.  No recent travel or sick contacts or recent abx.  Patient states similar sx in past due to a UTI. Pt was recently discharged from [facility] on 4/27 after being treated for KLEBSIELLA  urine cx  with three organsims   BLOOD CX NEG  UNCLEAR IF PYELO  IMPROVING ON ABX  OK TO JENNIFER TO PO VANTIN WOULD COMPLETE TOTAL AT LEAST 7 DAYS  ORTHO TO EVAL AS HX OF BACK ISSUES AND CHANGES ON  IMAGING  WILL FOLLOW UP AS NEEDED PLEASE CALL IF QUESTIONS
>Sepsis due to Clinical Pyelonephritis - discussed with ID.  Change to po antibiotics.  If pt remains stable, plan d/c in am.    > L4-5 enhancement - Pts symptoms do not appear to correlate with L4-5 enhancement.  No abscess visualized.  Discussed with spine Dr. Cevallos.  Pt counselled on need for outpatient followup.    >Recurrent UTIs (has duplicated right collecting system)- Urology input appreciated.   >Uncontrolled DM2- Increase Lantus, Continue Insulin coverage.    >Bipolar Disorder- c/w seroquel  >Depression and Anxiety- c/w prozac  >asthma - c/w inhalers  > DVT Prophylaxis - Lovenox subcut
>Sepsis due to Clinical Pyelonephritis - discussed with Urology.  continue IV Abx.  F/u cultures.  ID followup d/w Dr. Arteaga.  Appears to be responding to IV Abx.  Possible transition to po abx.    > L4-5 enhancement - Pts symptoms do not appear to correlate with L4-5 enhancement.  No abscess visualized.  On IV Abx.  Observe for now.   >Recurrent UTIs (has duplicated right collecting system)- Urology input appreciated.   >Uncontrolled DM2- Increase Lantus, Continue Insulin coverage.    >Bipolar Disorder- c/w seroquel  >Depression and Anxiety- c/w prozac  >asthma - c/w inhalers  > DVT Prophylaxis - Lovenox subcut
>Sepsis due to Clinical Pyelonephritis - discussed with Urology.  continue IV Abx.  F/u cultures.  ID followup d/w Dr. Arteaga.  Clinically with flank pain, suspect pyelonephritis.  Appears to be responding to IV Abx.  D/c Tele.   > L4-5 enhancement - Pts symptoms do not appear to correlate with L4-5 enhancement.  No abscess visualized.  Will d/w ID.  On IV Abx.  Observe for now.   >Recurrent UTIs (has duplicated right collecting system)- Urology input appreciated.   >Uncontrolled DM2- Increase Lantus, Continue Insulin coverage.    >Bipolar Disorder- c/w seroquel  >Depression and Anxiety- c/w prozac  >asthma - c/w inhalers  > DVT Prophylaxis - Lovenox subcut
>Sepsis due to Clinical Pyelonephritis - discussed with Urology.  continue IV Abx.  F/u cultures.  ID followup.    > L4-5 enhancement - Pts symptoms do not appear to correlate with L4-5 enhancement.  No abscess visualized.  Will d/w ID.  On IV Abx.  Observe for now.   >Recurrent UTIs (has duplicated right collecting system)- Urology input appreciated.   >Uncontrolled DM2- Continue Lantus, Insulin coverage.  If FS remains elevated, will increase Lantus dosing.  Monitor FS.  >Bipolar Disorder- c/w seroquel  >Depression and Anxiety- c/w prozac  >asthma - c/w inhalers  > DVT Prophylaxis - Lovenox subcut

## 2018-08-04 NOTE — DISCHARGE NOTE ADULT - MEDICATION SUMMARY - MEDICATIONS TO TAKE
I will START or STAY ON the medications listed below when I get home from the hospital:    aspirin 81 mg oral tablet  -- 1 tab(s) by mouth once a day  -- Indication: For Outpatient medication    Percocet 5/325 oral tablet  -- 1 tab(s) by mouth every 8 hours, As Needed pain  -- Indication: For Pain    gabapentin 300 mg oral capsule  -- 2 tab(s) by mouth 3 times a day  -- Indication: For Neuropathy    PROzac  -- 50 milligram(s) by mouth once a day  -- Indication: For Depression    HumuLIN 70/30 subcutaneous suspension  -- 15 unit(s) subcutaneous 2 times a day  -- Indication: For Diabetes    metFORMIN 1000 mg oral tablet  -- 1 tab(s) by mouth 2 times a day  -- Indication: For Diabetes    Basaglar KwikPen 100 units/mL subcutaneous solution  -- 6 unit(s) subcutaneous once a day (at bedtime)  -- Indication: For Diabetes    simvastatin 20 mg oral tablet  -- 1 tab(s) by mouth once a day (at bedtime)  -- Indication: For Hyperlipidemia    SEROquel 150 mg oral tablet, extended release  -- 1 tab(s) by mouth once a day (in the evening)  -- Indication: For Anxiety    ProAir HFA CFC free 90 mcg/inh inhalation aerosol  -- 2 puff(s) inhaled 4 times a day, As Needed  -- Indication: For Asthma    Symbicort 160 mcg-4.5 mcg/inh inhalation aerosol  -- 1 puff(s) inhaled 2 times a day   -- Check with your doctor before becoming pregnant.  For inhalation only.  Rinse mouth thoroughly after use.    -- Indication: For Asthma    cefpodoxime 200 mg oral tablet  -- 1 tab(s) by mouth every 12 hours  -- Indication: For Pyelonephritis    methocarbamol 750 mg oral tablet  -- 1 tab(s) by mouth 2 times a day  -- May cause drowsiness.  Alcohol may intensify this effect.  Use care when operating dangerous machinery.    -- Indication: For Back pain    saccharomyces boulardii lyo 250 mg oral capsule  -- 1 cap(s) by mouth 2 times a day  -- Indication: For Supplement    Vitamin D3 1000 intl units oral tablet  -- 1 tab(s) by mouth once a day  -- Indication: For Supplement I will START or STAY ON the medications listed below when I get home from the hospital:    .  -- Cane  Dispense #1  Dx: 26.89    -- Indication: For Ambulation    Percocet 5/325 oral tablet  -- 1 tab(s) by mouth every 8 hours, As Needed pain  -- Indication: For Pain    aspirin 81 mg oral tablet  -- 1 tab(s) by mouth once a day  -- Indication: For Outpatient medication    gabapentin 300 mg oral capsule  -- 2 tab(s) by mouth 3 times a day  -- Indication: For Neuropathy    PROzac  -- 50 milligram(s) by mouth once a day  -- Indication: For Depression    HumuLIN 70/30 subcutaneous suspension  -- 15 unit(s) subcutaneous 2 times a day  -- Indication: For Diabetes    metFORMIN 1000 mg oral tablet  -- 1 tab(s) by mouth 2 times a day  -- Indication: For Diabetes    Basaglar KwikPen 100 units/mL subcutaneous solution  -- 6 unit(s) subcutaneous once a day (at bedtime)  -- Indication: For Diabetes    simvastatin 20 mg oral tablet  -- 1 tab(s) by mouth once a day (at bedtime)  -- Indication: For Hyperlipidemia    SEROquel 150 mg oral tablet, extended release  -- 1 tab(s) by mouth once a day (in the evening)  -- Indication: For Anxiety    ProAir HFA CFC free 90 mcg/inh inhalation aerosol  -- 2 puff(s) inhaled 4 times a day, As Needed  -- Indication: For Asthma    Symbicort 160 mcg-4.5 mcg/inh inhalation aerosol  -- 1 puff(s) inhaled 2 times a day   -- Check with your doctor before becoming pregnant.  For inhalation only.  Rinse mouth thoroughly after use.    -- Indication: For Asthma    cefpodoxime 200 mg oral tablet  -- 1 tab(s) by mouth every 12 hours  -- Indication: For Pyelonephritis    methocarbamol 750 mg oral tablet  -- 1 tab(s) by mouth 2 times a day  -- May cause drowsiness.  Alcohol may intensify this effect.  Use care when operating dangerous machinery.    -- Indication: For Back pain    saccharomyces boulardii lyo 250 mg oral capsule  -- 1 cap(s) by mouth 2 times a day  -- Indication: For Supplement    Vitamin D3 1000 intl units oral tablet  -- 1 tab(s) by mouth once a day  -- Indication: For Supplement

## 2018-08-04 NOTE — DISCHARGE NOTE ADULT - PLAN OF CARE
Stable for discharge It is important to see your primary physician as well as the physicians noted below within the next week to perform a comprehensive medical review.  Call their offices for an appointment as soon as you leave the hospital.  If you do not have a primary physician, contact the [facility] [place] [phone] located on [address] [place] [address].  Your medical issues appear to be stable at this time, but if your symptoms recur or worsen, contact your physicians and/or return to the hospital if necessary.  If you encounter any issues or questions with your medication, call your physicians before stopping the medication.  Do not drive.  Diabetic diet.

## 2018-08-04 NOTE — DISCHARGE NOTE ADULT - SECONDARY DIAGNOSIS.
Sepsis, due to unspecified organism Type 2 diabetes mellitus with diabetic polyneuropathy, with long-term current use of insulin Depression, unspecified depression type Bipolar affective disorder, remission status unspecified

## 2018-08-05 PROCEDURE — 82962 GLUCOSE BLOOD TEST: CPT

## 2018-08-05 PROCEDURE — 36415 COLL VENOUS BLD VENIPUNCTURE: CPT

## 2018-08-05 PROCEDURE — 80048 BASIC METABOLIC PNL TOTAL CA: CPT

## 2018-08-05 PROCEDURE — 87086 URINE CULTURE/COLONY COUNT: CPT

## 2018-08-05 PROCEDURE — 93005 ELECTROCARDIOGRAM TRACING: CPT

## 2018-08-05 PROCEDURE — 87040 BLOOD CULTURE FOR BACTERIA: CPT

## 2018-08-05 PROCEDURE — 82009 KETONE BODYS QUAL: CPT

## 2018-08-05 PROCEDURE — 72158 MRI LUMBAR SPINE W/O & W/DYE: CPT

## 2018-08-05 PROCEDURE — 94760 N-INVAS EAR/PLS OXIMETRY 1: CPT

## 2018-08-05 PROCEDURE — 80053 COMPREHEN METABOLIC PANEL: CPT

## 2018-08-05 PROCEDURE — 94640 AIRWAY INHALATION TREATMENT: CPT

## 2018-08-05 PROCEDURE — 99285 EMERGENCY DEPT VISIT HI MDM: CPT | Mod: 25

## 2018-08-05 PROCEDURE — 86140 C-REACTIVE PROTEIN: CPT

## 2018-08-05 PROCEDURE — 96375 TX/PRO/DX INJ NEW DRUG ADDON: CPT

## 2018-08-05 PROCEDURE — 83605 ASSAY OF LACTIC ACID: CPT

## 2018-08-05 PROCEDURE — 71045 X-RAY EXAM CHEST 1 VIEW: CPT

## 2018-08-05 PROCEDURE — 84100 ASSAY OF PHOSPHORUS: CPT

## 2018-08-05 PROCEDURE — 80202 ASSAY OF VANCOMYCIN: CPT

## 2018-08-05 PROCEDURE — 81001 URINALYSIS AUTO W/SCOPE: CPT

## 2018-08-05 PROCEDURE — 76775 US EXAM ABDO BACK WALL LIM: CPT

## 2018-08-05 PROCEDURE — 96365 THER/PROPH/DIAG IV INF INIT: CPT

## 2018-08-05 PROCEDURE — 83735 ASSAY OF MAGNESIUM: CPT

## 2018-08-05 PROCEDURE — 85652 RBC SED RATE AUTOMATED: CPT

## 2018-08-05 PROCEDURE — 96367 TX/PROPH/DG ADDL SEQ IV INF: CPT

## 2018-08-05 PROCEDURE — 83036 HEMOGLOBIN GLYCOSYLATED A1C: CPT

## 2018-08-05 PROCEDURE — 85027 COMPLETE CBC AUTOMATED: CPT

## 2018-08-14 ENCOUNTER — APPOINTMENT (OUTPATIENT)
Dept: ORTHOPEDIC SURGERY | Facility: CLINIC | Age: 58
End: 2018-08-14
Payer: MEDICAID

## 2018-08-14 VITALS
BODY MASS INDEX: 26.58 KG/M2 | HEART RATE: 76 BPM | HEIGHT: 63 IN | SYSTOLIC BLOOD PRESSURE: 129 MMHG | WEIGHT: 150 LBS | DIASTOLIC BLOOD PRESSURE: 73 MMHG

## 2018-08-14 DIAGNOSIS — M46.96 UNSPECIFIED INFLAMMATORY SPONDYLOPATHY, LUMBAR REGION: ICD-10-CM

## 2018-08-14 DIAGNOSIS — M16.9 OSTEOARTHRITIS OF HIP, UNSPECIFIED: ICD-10-CM

## 2018-08-14 DIAGNOSIS — Z87.39 PERSONAL HISTORY OF OTHER DISEASES OF THE MUSCULOSKELETAL SYSTEM AND CONNECTIVE TISSUE: ICD-10-CM

## 2018-08-14 DIAGNOSIS — G56.01 CARPAL TUNNEL SYNDROME, RIGHT UPPER LIMB: ICD-10-CM

## 2018-08-14 DIAGNOSIS — Z86.39 PERSONAL HISTORY OF OTHER ENDOCRINE, NUTRITIONAL AND METABOLIC DISEASE: ICD-10-CM

## 2018-08-14 PROCEDURE — 99214 OFFICE O/P EST MOD 30 MIN: CPT

## 2018-08-27 ENCOUNTER — APPOINTMENT (OUTPATIENT)
Dept: ENDOCRINOLOGY | Facility: CLINIC | Age: 58
End: 2018-08-27

## 2018-09-12 ENCOUNTER — OTHER (OUTPATIENT)
Age: 58
End: 2018-09-12

## 2018-09-16 ENCOUNTER — RX RENEWAL (OUTPATIENT)
Age: 58
End: 2018-09-16

## 2018-09-17 ENCOUNTER — RX RENEWAL (OUTPATIENT)
Age: 58
End: 2018-09-17

## 2018-09-17 DIAGNOSIS — E11.65 TYPE 2 DIABETES MELLITUS WITH HYPERGLYCEMIA: ICD-10-CM

## 2018-09-17 RX ORDER — INSULIN ASPART 100 [IU]/ML
100 INJECTION, SOLUTION INTRAVENOUS; SUBCUTANEOUS
Qty: 2 | Refills: 0 | Status: ACTIVE | COMMUNITY
Start: 2018-05-04 | End: 1900-01-01

## 2018-09-21 ENCOUNTER — EMERGENCY (EMERGENCY)
Facility: HOSPITAL | Age: 58
LOS: 1 days | Discharge: HOME | End: 2018-09-21
Attending: EMERGENCY MEDICINE
Payer: MEDICAID

## 2018-09-21 VITALS
HEIGHT: 66 IN | HEART RATE: 82 BPM | OXYGEN SATURATION: 96 % | WEIGHT: 164.91 LBS | SYSTOLIC BLOOD PRESSURE: 115 MMHG | RESPIRATION RATE: 18 BRPM | DIASTOLIC BLOOD PRESSURE: 72 MMHG | TEMPERATURE: 98 F

## 2018-09-21 DIAGNOSIS — Z98.89 OTHER SPECIFIED POSTPROCEDURAL STATES: Chronic | ICD-10-CM

## 2018-09-21 DIAGNOSIS — Z87.828 PERSONAL HISTORY OF OTHER (HEALED) PHYSICAL INJURY AND TRAUMA: Chronic | ICD-10-CM

## 2018-09-21 DIAGNOSIS — Z90.710 ACQUIRED ABSENCE OF BOTH CERVIX AND UTERUS: Chronic | ICD-10-CM

## 2018-09-21 PROCEDURE — 96372 THER/PROPH/DIAG INJ SC/IM: CPT

## 2018-09-21 PROCEDURE — 99284 EMERGENCY DEPT VISIT MOD MDM: CPT

## 2018-09-21 PROCEDURE — 99283 EMERGENCY DEPT VISIT LOW MDM: CPT | Mod: 25

## 2018-09-21 RX ORDER — KETOROLAC TROMETHAMINE 30 MG/ML
60 SYRINGE (ML) INJECTION ONCE
Qty: 0 | Refills: 0 | Status: DISCONTINUED | OUTPATIENT
Start: 2018-09-21 | End: 2018-09-21

## 2018-09-21 RX ORDER — LIDOCAINE 4 G/100G
1 CREAM TOPICAL ONCE
Qty: 0 | Refills: 0 | Status: DISCONTINUED | OUTPATIENT
Start: 2018-09-21 | End: 2018-09-26

## 2018-09-21 RX ORDER — LIDOCAINE 4 G/100G
1 CREAM TOPICAL
Qty: 10 | Refills: 0 | OUTPATIENT
Start: 2018-09-21 | End: 2018-09-25

## 2018-09-21 RX ORDER — OXYCODONE AND ACETAMINOPHEN 5; 325 MG/1; MG/1
1 TABLET ORAL ONCE
Qty: 0 | Refills: 0 | Status: DISCONTINUED | OUTPATIENT
Start: 2018-09-21 | End: 2018-09-21

## 2018-09-21 RX ADMIN — Medication 60 MILLIGRAM(S): at 16:43

## 2018-09-21 RX ADMIN — OXYCODONE AND ACETAMINOPHEN 1 TABLET(S): 5; 325 TABLET ORAL at 16:43

## 2018-09-21 NOTE — ED STATDOCS - CONSTITUTIONAL, MLM
normal... well appearing, well nourished, and in moderate-severe distress, unable to sit up in chair.

## 2018-09-21 NOTE — ED STATDOCS - CHPI ED SYMPTOM NEG
no hematuria/no fever/no burning urination/difficulty with bowel movement, neck pain, HA/no vomiting/no dysuria/no nausea

## 2018-09-21 NOTE — ED ADULT NURSE NOTE - CAS TRG GEN SKIN CONDITION
"Anticoagulation by Pharmacy - Warfarin    Dorcas Bain is a 77 y.o.female  [Ht: 157.5 cm (62\"); Wt: 57.9 kg (127 lb 10.3 oz)] on Warfarin 2.5 mg PO tonight for indication of A. fib.    Goal INR: 2-3  Today's INR:   Lab Results   Component Value Date    INR 1.89 (H) 03/12/2018         Lab Results   Component Value Date    INR 1.89 (H) 03/12/2018    INR 2.22 (H) 03/11/2018    INR 1.98 (H) 03/10/2018    PROTIME 21.0 (H) 03/12/2018    PROTIME 23.7 (H) 03/11/2018    PROTIME 21.7 (H) 03/10/2018     Lab Results   Component Value Date    HGB 9.5 (L) 03/12/2018    HGB 9.3 (L) 03/11/2018    HGB 9.5 (L) 03/10/2018     Lab Results   Component Value Date    HCT 29.8 (L) 03/12/2018    HCT 29.1 (L) 03/11/2018    HCT 29.6 (L) 03/10/2018     Assessment/Plan:  INR slightly below goal but patient is scheduled to receive a larger dose of warfarin tonight.  Will continue home dose.  Patient is also on aspirin.  Pharmacy will continue to follow.    Viktor Gibson III, [facility]  03/12/18 10:27 AM     " Warm

## 2018-09-21 NOTE — ED STATDOCS - ATTENDING CONTRIBUTION TO CARE
I, Aureliano Angulo, performed the initial face to face bedside interview with this patient regarding history of present illness, review of symptoms and relevant past medical, social and family history.  I completed an independent physical examination.  I was the initial provider who evaluated this patient. I have signed out the follow up of any pending tests (i.e. labs, radiological studies) to the ACP.  I have communicated the patient’s plan of care and disposition with the ACP.

## 2018-09-21 NOTE — ED STATDOCS - PHYSICAL EXAMINATION
decrease sensation right lateral thigh, good strength plantar flexion, dorsi flexion  (+) straight leg test b/l

## 2018-09-21 NOTE — ED STATDOCS - PROGRESS NOTE DETAILS
59 y/o F pt presents to the ED c/o worsening pain and swelling to b/l hips and LE.  She notes worsening pain to her lower back and hip for the past one week.  She was recently visiting a relative in the ED and was sitting in a chair for 8+ hours, and states this has aggravated her pain.  Pt follows up with a Pain Management doctor for her pain, had an epidural one week ago, and has been prescribed percocet in the past.  She ran out of percocet so she has no pain medication left.  She also notes burning sensation to her LE and numbness in soles of feet.  Denies urinary symptoms, difficulty with bowel movements, fevers, chills.  No further acute complaints at this time.  Exam: moderate to severe pain, unable to sit in chair  Pt will be sent to the Main ED for further treatment. Initial orders placed. Initiated treatment with IM Toradol and PO percocet. PA NOTE: Pt seen by intake physician and HPI/ROS/PE/MDM reviewed. Pt seen and evaluated. Discussed plan and any resulted studies at this time. Will continue to monitor and re-evaluate.  Re-Evaluation: pt is sitting comfortably in chair and in no apparent distress. pt states the pain has decreased from a 15/10 to 8/10. Will order lidocaine patch and reevaluate in 30 minutes to see if pt can ambulate. PA Note: pt states pain has improved to a tolerable leave and is able to ambulate to restroom. Will d/c with 5 days pain medication to get her to her appointment on Tuesday.

## 2018-09-21 NOTE — ED STATDOCS - GASTROINTESTINAL NEGATIVE STATEMENT, MLM
no nausea and no vomiting. no difficulty with bowel movements. no abdominal pain, no bloating, no constipation, no diarrhea, no nausea and no vomiting.

## 2018-09-21 NOTE — ED ADULT NURSE NOTE - NSIMPLEMENTINTERV_GEN_ALL_ED
Implemented All Universal Safety Interventions:  [place] to call system. Call bell, personal items and telephone within reach. Instruct patient to call for assistance. Room bathroom lighting operational. Non-slip footwear when patient is off stretcher. Physically safe environment: no spills, clutter or unnecessary equipment. Stretcher in lowest position, wheels locked, appropriate side rails in place.

## 2018-09-21 NOTE — ED STATDOCS - OBJECTIVE STATEMENT
57 y/o F pt presents to the ED c/o worsening pain and swelling to b/l hips, lower back and LE for the past one week.  She was recently visiting a relative in the ED and was sitting in a chair for 8+ hours, and states this has aggravated her pain.  Pt follows up with a Pain Management doctor for her pain, had an epidural one week ago, and has been prescribed percocet in the past.  She ran out of percocet so she has no pain medication left.  Denies urinary symptoms, difficulty with bowel movements, fevers, chills, N/V, HA, neck pain.  No further acute complaints at this time. 59 y/o F pt presents to the ED c/o worsening pain and swelling to b/l hips, lower back and LE for the past one week.  She was recently visiting a relative in the ED and was sitting in a chair for 8+ hours, and states this has aggravated her pain.  Pt follows up with a Pain Management doctor for her pain, had an epidural one week ago, and has been prescribed percocet in the past.  She ran out of percocet so she has no pain medication left.  Denies urinary symptoms, difficulty with bowel movements, fevers, chills, N/V, HA, neck pain.  No further acute complaints at this time. Has appt with pain mgt Tuesday.

## 2018-09-21 NOTE — ED ADULT NURSE NOTE - CHIEF COMPLAINT QUOTE
Patient arrived via EMS, awake alert, and oriented times 3, breathing unlabored.  Patient complaining of bilateral lower back pain which has been starting since Tuesday.  pain radiates down bilateral legs.  Patient states feel same as sciatica symptoms.  patient also states headache

## 2018-10-17 ENCOUNTER — APPOINTMENT (OUTPATIENT)
Dept: ORTHOPEDIC SURGERY | Facility: CLINIC | Age: 58
End: 2018-10-17
Payer: MEDICAID

## 2018-10-17 VITALS — WEIGHT: 150 LBS | HEIGHT: 63 IN | BODY MASS INDEX: 26.58 KG/M2

## 2018-10-17 DIAGNOSIS — M70.61 TROCHANTERIC BURSITIS, RIGHT HIP: ICD-10-CM

## 2018-10-17 DIAGNOSIS — M16.11 UNILATERAL PRIMARY OSTEOARTHRITIS, RIGHT HIP: ICD-10-CM

## 2018-10-17 PROCEDURE — 99214 OFFICE O/P EST MOD 30 MIN: CPT | Mod: 25

## 2018-10-17 PROCEDURE — 73502 X-RAY EXAM HIP UNI 2-3 VIEWS: CPT | Mod: RT

## 2018-10-17 PROCEDURE — 20610 DRAIN/INJ JOINT/BURSA W/O US: CPT | Mod: RT

## 2018-10-17 NOTE — H&P ADULT - NSTOBACCOSCREENHP_GEN_A_NCS
Respiratory: Negative for shortness of breath and wheezing. Cardiovascular: Negative for chest pain and palpitations. Gastrointestinal: Negative for nausea and vomiting. Endocrine: Negative for polyphagia and polyuria. Genitourinary: Negative for difficulty urinating and flank pain. Neurological: Negative for dizziness, light-headedness and headaches. Psychiatric/Behavioral: Negative for decreased concentration, hallucinations, sleep disturbance and suicidal ideas. OBJECTIVE:  Pulse Readings from Last 4 Encounters:   10/17/18 78   05/14/18 72   02/12/18 72   01/29/18 72     Wt Readings from Last 4 Encounters:   10/17/18 279 lb (126.6 kg)   05/14/18 270 lb (122.5 kg)   02/12/18 275 lb (124.7 kg)   01/29/18 285 lb (129.3 kg)     BP Readings from Last 4 Encounters:   10/17/18 108/80   05/14/18 122/82   02/12/18 (!) 130/96   01/29/18 118/86     Physical Exam   Constitutional: He is oriented to person, place, and time. obese   Neck: Neck supple. No thyromegaly present. Cardiovascular: Normal rate, regular rhythm and normal heart sounds. Pulmonary/Chest: Effort normal and breath sounds normal. No respiratory distress. He has no wheezes. He has no rales. Abdominal: Soft. Bowel sounds are normal.   Musculoskeletal: He exhibits no edema. Lymphadenopathy:     He has no cervical adenopathy. Neurological: He is alert and oriented to person, place, and time. Psychiatric: He has a normal mood and affect. Thought content normal.   Nursing note and vitals reviewed.       CBC:   Lab Results   Component Value Date    WBC 8.9 11/01/2012    HGB 14.7 11/01/2012    HCT 44.0 11/01/2012     11/01/2012     CMP:  Lab Results   Component Value Date     02/24/2018    K 3.9 02/24/2018     02/24/2018    CO2 25 02/24/2018    ANIONGAP 14 02/24/2018    GLUCOSE 90 02/24/2018    BUN 17 02/24/2018    CREATININE 0.7 02/24/2018    GFRAA >60 02/24/2018    GFRAA >60 11/01/2012    CALCIUM 8.8 02/24/2018
Yes

## 2018-10-19 ENCOUNTER — FORM ENCOUNTER (OUTPATIENT)
Age: 58
End: 2018-10-19

## 2018-10-20 ENCOUNTER — APPOINTMENT (OUTPATIENT)
Dept: MRI IMAGING | Facility: CLINIC | Age: 58
End: 2018-10-20
Payer: MEDICAID

## 2018-10-20 ENCOUNTER — OUTPATIENT (OUTPATIENT)
Dept: OUTPATIENT SERVICES | Facility: HOSPITAL | Age: 58
LOS: 1 days | End: 2018-10-20
Payer: MEDICAID

## 2018-10-20 DIAGNOSIS — Z90.710 ACQUIRED ABSENCE OF BOTH CERVIX AND UTERUS: Chronic | ICD-10-CM

## 2018-10-20 DIAGNOSIS — Z98.89 OTHER SPECIFIED POSTPROCEDURAL STATES: Chronic | ICD-10-CM

## 2018-10-20 DIAGNOSIS — M70.61 TROCHANTERIC BURSITIS, RIGHT HIP: ICD-10-CM

## 2018-10-20 DIAGNOSIS — Z87.828 PERSONAL HISTORY OF OTHER (HEALED) PHYSICAL INJURY AND TRAUMA: Chronic | ICD-10-CM

## 2018-10-20 DIAGNOSIS — M16.11 UNILATERAL PRIMARY OSTEOARTHRITIS, RIGHT HIP: ICD-10-CM

## 2018-10-20 PROCEDURE — 73721 MRI JNT OF LWR EXTRE W/O DYE: CPT

## 2018-10-20 PROCEDURE — 73721 MRI JNT OF LWR EXTRE W/O DYE: CPT | Mod: 26,RT

## 2018-11-28 ENCOUNTER — RECORD ABSTRACTING (OUTPATIENT)
Age: 58
End: 2018-11-28

## 2018-11-28 DIAGNOSIS — E78.5 HYPERLIPIDEMIA, UNSPECIFIED: ICD-10-CM

## 2018-11-28 DIAGNOSIS — E04.0 NONTOXIC DIFFUSE GOITER: ICD-10-CM

## 2018-11-28 DIAGNOSIS — Z79.4 LONG TERM (CURRENT) USE OF INSULIN: ICD-10-CM

## 2018-11-29 ENCOUNTER — APPOINTMENT (OUTPATIENT)
Dept: ENDOCRINOLOGY | Facility: CLINIC | Age: 58
End: 2018-11-29

## 2019-05-16 NOTE — ED ADULT NURSE NOTE - NSFALLRSKASSESASSIST_ED_ALL_ED
Add 00944 Cpt? (Important Note: In 2017 The Use Of 97537 Is Being Tracked By Cms To Determine Future Global Period Reimbursement For Global Periods): yes
Detail Level: Detailed
no

## 2019-06-06 NOTE — ED STATDOCS - EYES NEGATIVE STATEMENT, MLM
no discharge, no irritation, no pain, no redness, and no visual changes. Banner Transposition Flap Text: The defect edges were debeveled with a #15 scalpel blade.  Given the location of the defect and the proximity to free margins a Banner transposition flap was deemed most appropriate.  Using a sterile surgical marker, an appropriate flap drawn around the defect. The area thus outlined was incised deep to adipose tissue with a #15 scalpel blade.  The skin margins were undermined to an appropriate distance in all directions utilizing iris scissors.

## 2020-03-12 NOTE — ED STATDOCS - NS_EDPROVIDERDISPOUSERTYPE_ED_A_ED
Price (Use Numbers Only, No Special Characters Or $): 500 Scribe Attestation (For Scribes USE Only)... Attending Attestation (For Attendings USE Only).../Scribe Attestation (For Scribes USE Only)...

## 2020-05-27 NOTE — ED STATDOCS - GASTROINTESTINAL, MLM
Notified pt's spouse that [facility] has pt's rx ready to be picked up. Spouse verbalized understanding.    abdomen soft, non-tender, and non-distended. Bowel sounds present.

## 2020-06-16 NOTE — ED PROVIDER NOTE - CHPI ED SYMPTOMS NEG
no weakness [Maximal Pain Intensity: 0/10] : 0/10 [80: Normal activity with effort; some signs or symptoms of disease.] : 80: Normal activity with effort; some signs or symptoms of disease.  [Least Pain Intensity: 0/10] : 0/10

## 2021-04-13 NOTE — ED ADULT TRIAGE NOTE - CHIEF COMPLAINT QUOTE
Patient has questions for the nurse regarding appointment yesterday.    Please call [phone].     Patient arrived via EMS, awake alert, and oriented times 3, breathing unlabored.  Patient complaining of bilateral lower back pain which has been starting since Tuesday.  pain radiates down bilateral legs.  Patient states feel same as sciatica symptoms.  patient also states headache

## 2021-04-14 NOTE — ED ADULT TRIAGE NOTE - HEIGHT IN CM
Patient called left message on [facility] nurse's voicemail returning Lai's, CNP call. Please advise.   [phone]

## 2021-07-26 NOTE — PATIENT PROFILE ADULT. - SOCIAL CONCERNS
July 26, 2021     Patient: Mathieu Belcher   YOB: 1971   Date of Visit: 7/26/2021       To Whom it May Concern:    Mathieu Belcher was seen in my clinic on 7/26/2021 at 9:00 am.    Please excuse Mathieu for his absence from work on the date listed above to be able to make his appointment.  Please also excuse him from work on Monday 7/26/2021 through Wednesday 7/28/2021 due to illness.        Sincerely,           Marleny Rivero APN-[facility]    Medical information is confidential and cannot be disclosed without the written consent of the patient or his representative.      
None
None

## 2021-11-05 NOTE — ED ADULT TRIAGE NOTE - NS ED NURSE BANDS TYPE
Name band; Assessment     86 year old M with PMH of nephrolithiasis, DM, Crohn s/p colostomy, CKD 4, HTN, HLD, BPH who presented with weakness. CT a/p showed bladder wall is thickened and patient found to have JOSH with creat 8.3. His electrolytes improved with no need for hemodialysis and he was downgraded from the ICU on 10/26.       Hematochezia diversion colitis, diverticulosis, internal hemorrhoids   AMS  JOSH / CKD4  chronic anemia   hematuria   BRBPR  acute and chronic DVT   HO DM   HO chrons disease s/p colectomy   BPH     #Acute on chronic anemia   #Hematochezia   s/p 2U pRBC this admission   maintain hgb >7  - small amount of blood noted 11/3  - HD stable   - GI consult- s/p flexible sigmoidoscopy 11/4, Flexible sigmoidoscopy findings: The rectal mucosa looked erythematous and friable and was covered with multiple blood clots and mucus. diversion colitis / Diverticulosis of the rectosigmoid junction. Internal hemorrhoids. The scope was advanced to the rectosigmoid junction but could not be advanced further in the colon due to the presence of blood clots and structuring of the sigmoid in the setting of the diverticular disease.    - heparin drip restarted, monitor CBC q12hr   - monitor PTT      #hematuria - resolved   - likely from pulling at [facility]      # altered mental status - improving   likely 2/2 metabolic encephalopathy and delirium  -avoid CNS depressant  -monitor electrolytes   -strict Is and Os  - frequent re-orientaion     #JOSH on CKD4   - non-oliguric  - creat stable at 5, baseline 2.2   - nephro consult recs appreciated   - c/w phoslo 667mg tid     #Acute pyelonephritis - resolved   #Abdominal wall cellulitis around stoma- resolved   - Blood & Urine cxs NGTD 10/23  - completed course of rocephin 10 days     #inderterminate bilateral renal lesions   -outpt fu     #acute and chronic DVT  - on elliquis at home  - heparin drip restarted     #HLD   - atovastatin 20mg qd   - aspirin 81mg     #HTN   - amlodipine 10mg qd   - hydralazine 25mg tid     # DVT prophylaxis: heparin drip     # GI prophylaxis: pantoprazole  # Diet: renal   # Activity Score (AM-PAC)  # Code status: full code  # Disposition: acute for now,

## 2022-06-03 NOTE — ED ADULT TRIAGE NOTE - ESI TRIAGE ACUITY LEVEL, MLM
[facility]  Office: [address], DO, Negrito Ronald, DO, Lalito Phoenix, DO, Isaias Raymundoing Blood, DO, Laura Hernandez MD, Garrett Juarez MD, Oscar Field MD, Diomedes Oswald MD, Ramakrishna Benito MD, Earline King MD, Sheree Clifton MD, Keri Tatum, DO, Mike Hatfield MD,  Melia Bhatt DO, Erin Oscar MD, Nasreen Arambula MD, Claudy Pritchard MD, Yoan Arteaga DO, Kian Ji MD, Yari Lo MD, Dilip Newman DO, Emilee Winter MD, Chris Okeefe [facility], [facility], [facility], Keyon Reyes, CNP, Annemarie Mclain, CNP, Ricki Cazares, CNP, Lina Stiles, CNP, Catheryn Leventhal, PA-C, Arline Pacheco, [facility], Libra Bah, CNP, Richa Vivas, CNP, Nico Santacruz, CNP, Colette Orozco, CNS, Ludin De La Rosa, [facility], Mary Contreras, CNP, Alen Jimenez, [facility], Sadie Pardo, [address] [place] [address]    Progress Note    6/3/2022    7:25 AM    Name:   Ton Berrios  MRN:     [id number]     Acct:      [de-identified]   Room:   [address] [place] [address] Day:  1  Admit Date:  6/2/2022 12:10 AM    PCP:   No primary care provider on file. Code Status:  Full Code    Subjective:     C/C:   Chief Complaint   Patient presents with    Loss of Consciousness     45mns PTA      Interval History Status: improved. Pt was seen and examined this morning  No acute events overnight   Denies having any complaints at this time       Review of Systems:     12 point ROS performed and negative for anything other than what was stated in subjective     Medications:      Allergies:  No Known Allergies    Current Meds:   Scheduled Meds:    potassium chloride  10 mEq IntraVENous Once    sodium chloride flush  5-40 mL IntraVENous 2 times per day     Continuous Infusions:    heparin (PORCINE) Infusion 16 Units/kg/hr (06/02/22 2302)    sodium chloride       PRN Meds: heparin (porcine), heparin (porcine), sodium chloride flush, sodium chloride, potassium chloride **OR** potassium alternative oral replacement **OR** potassium chloride, magnesium sulfate, ondansetron **OR** ondansetron, acetaminophen **OR** acetaminophen, bisacodyl    Data:     Past Medical History:   has a past medical history of Alcohol use, History of cocaine use, Obesity with body mass index greater than 30, and Tobacco abuse. Social History:   reports that he has been smoking cigarettes. He has never used smokeless tobacco. He reports current alcohol use. He reports current drug use. Drugs: Marijuana (Weed) and Cocaine. Family History:   Family History   Problem Relation Age of Onset    Diabetes type 2  Father        Vitals:  /85   Pulse 82   Temp 98.2 °F (36.8 °C) (Oral)   Resp 14   Ht 5' 11\" (1.803 m)   Wt 233 lb 0.4 oz (105.7 kg)   SpO2 95%   BMI 32.50 kg/m²   Temp (24hrs), Av.3 °F (36.8 °C), Min:98.1 °F (36.7 °C), Max:98.6 °F (37 °C)    Recent Labs     22  0332   POCGLU 133*       I/O (24Hr):     Intake/Output Summary (Last 24 hours) at 6/3/2022 0725  Last data filed at 6/3/2022 0600  Gross per 24 hour   Intake 382.65 ml   Output 2350 ml   Net -1967.35 ml       Labs:  Hematology:  Recent Labs     22  0050 22  0904 22  0557   WBC 13.6*  --  10.9   RBC 5.09  --  4.89   HGB 16.0  --  15.4   HCT 46.6  --  43.6   MCV 91.6  --  89.2   MCH 31.4  --  31.5   MCHC 34.3  --  35.3*   RDW 12.5  --  12.4     --  168   MPV 11.3  --  10.3   CRP  --  <3.0  --    INR  --  1.0 1.0   DDIMER 1.57  --   --      Chemistry:  Recent Labs     22  0050 22  0050 22  0228 22  0332 22  0336 22  0348 22  0534 22  0904 22  1129 22  0557     --   --   --  137  --   --   --   --  139   K 3.1*  --   --   --  4.4  --   --   --   --  4.2     --   --   --  100  --   --   --   --  103   CO2 17*  --   --   --  20  --   --   --   --  25   GLUCOSE 296*   < >  --   --  138* 133  --   --   --  105*   BUN 11  --   --   --  10  --   -- --   --  7   CREATININE 1.12  --   --  1.01 0.94  --   --   --   --  0.72   ANIONGAP 21*  --   --   --  17  --   --   --   --  11   LABGLOM >60   < >  --  >60 >60  --   --   --   --  >60   GFRAA >60  --   --   --  >60  --   --   --   --  >60   CALCIUM 8.7  --   --   --  8.5*  --   --   --   --  9.0   PROBNP  --   --   --   --   --   --   --  186  --   --    TROPHS 13   < > 42*  --  59*  --   --   --  37*  --    LACTACIDWB  --   --   --   --   --   --  3.4*  --   --   --     < > = values in this interval not displayed. Recent Labs     06/02/22  0228 06/02/22  0332 06/02/22  0636 06/02/22  0904   PROT 6.5  --   --  6.5   LABALBU 4.4  --   --  4.2   LABA1C  --   --  5.3  --    AST 50*  --   --  73*   ALT 58*  --   --  61*   LDH  --   --   --  289*   ALKPHOS 119  --   --  116   BILITOT 0.24*  --   --  0.40   BILIDIR <0.08  --   --  0.08   CHOL  --   --  178  --    HDL  --   --  48  --    LDLCHOLESTEROL  --   --  122  --    CHOLHDLRATIO  --   --  3.7  --    TRIG  --   --  41  --    POCGLU  --  133*  --   --      ABG:No results found for: POCPH, PHART, PH, POCPCO2, DLV8IHA, PCO2, POCPO2, PO2ART, PO2, POCHCO3, XMK3SRI, HCO3, NBEA, PBEA, BEART, BE, THGBART, THB, EKH7ZVJ, KZCU4RBC, G7WXOTWN, O2SAT, FIO2  Lab Results   Component Value Date/Time    SPECIAL RIGHT AC 10ML 06/02/2022 05:37 AM     Lab Results   Component Value Date/Time    CULTURE NO GROWTH 1 DAY 06/02/2022 05:37 AM       Radiology:  XR CHEST PORTABLE    Result Date: 6/2/2022  Clear chest without acute cardiopulmonary process. CT CHEST PULMONARY EMBOLISM W CONTRAST    Result Date: 6/2/2022  1. No pulmonary emboli. 2. Patchy ground-glass changes seen in the lungs, greatest within the lower lobes. Findings suspicious for multifocal pneumonia such as viral pneumonia. Atelectasis is in the differential, with pulmonary edema felt unlikely.        Physical Examination:        General appearance:  alert, cooperative and no distress  Mental Status:  oriented to 4 person, place and time and normal affect  Lungs:  clear to auscultation bilaterally, normal effort  Heart:  regular rate and rhythm, no murmur  Abdomen:  soft, nontender, nondistended, normal bowel sounds, no masses, hepatomegaly, splenomegaly  Extremities:  no edema, redness, tenderness in the calves  Skin:  no gross lesions, rashes, induration    Assessment:        Hospital Problems           Last Modified POA    * (Principal) Overdose, accidental or unintentional, initial encounter 6/2/2022 Yes    NSTEMI (non-ST elevated myocardial infarction) ([address] 6/2/2022 Yes    Aspiration pneumonitis ([address] 6/2/2022 Yes    Polysubstance abuse ([facility] [address] 6/2/2022 Yes    Alcohol use 6/2/2022 Yes    Tobacco abuse 6/2/2022 Yes    Obesity with body mass index greater than 30 6/2/2022 Yes          Plan:        1. Accidental drug overdose  - Uncertain as to what substance, responded to Narcan  - drug screen negative for benzo, methadone and cannabinoid      2. NSTEMI  - Consult cardiology  - elevated troponin likely related to substance abuse   - check ECHO, if low risk can stop heparin drip and consider outpatient stress test      3. Patchy groundglass opacities on CT imaging  - Likely aspiration pneumonitis due to downtime  - given zithro and rocephin in ED  - currently afebrile with normal wbc count normal   - hold abx for now   - negative flu A/B and negative covid   - Patient does admit to a URI earlier this week.     4. Obesity with BMI of 32.78  -Diet/weight loss/exercise recommended     5.  Hyperglycemia  - A1c 5.3        Rosana Mcmillan MD  6/3/2022  7:25 AM 3

## 2022-09-15 NOTE — ED PROVIDER NOTE - ATTENDING CONTRIBUTION TO CARE
________________________________________________________________    ~ It was our pleasure to care for you today. Thank you for choosing [facility]. We hope you will be feeling better soon.~    Your team of care providers at [facility] thank you for using our services and wishes you the best!  We appreciate you completing a very short 3 question survey about our care provider team. You may receive this survey via text or email.    Hours:   Monday - Friday 8:00 am - 8:00 pm  Saturday & Sunday 8:00 am - 4:00 pm  ~~~~~~~~~~~~~~~~~~~~~~~~~~~~~~~~~~~~~~~~~~~~~~~~~~~~~~~~~~~~~    If your provider has ordered additional testing as part of your ongoing plan of care, please allow 5-7 business days (from the day of your visit) for the testing to be resulted and reviewed by your provider. You will be contacted via telephone if your results are abnormal or changes need to be made with your current plan. If you have any questions regarding your testing, please contact the nurse at [phone].      I personally saw the patient with the PA, and completed the key components of the history and physical exam. I then discussed the management plan with the PA.   gen in nad resp clear cardaic no murmur abd mild gernalzied ttp no distension agree with pa plan of caree

## 2023-01-05 NOTE — PATIENT PROFILE ADULT. - URINARY CATHETER
800mg ibu, then in 4 hrs 1000mg tylenol. May rotate for 3-5 days. (Tonight: 200mg ibu with 1000mg tylenol)  ICE, elevate. Splint for up to 1 week.   Back here with pain/swelling out of proportion.   Day 10 is the sweet spot to repeat xray if there is an occult fracture.   
no
no
Cheek Interpolation Flap Text: A decision was made to reconstruct the defect utilizing an interpolation axial flap and a staged reconstruction.  A telfa template was made of the defect.  This telfa template was then used to outline the Cheek Interpolation flap.  The donor area for the pedicle flap was then injected with anesthesia.  The flap was excised through the skin and subcutaneous tissue down to the layer of the underlying musculature.  The interpolation flap was carefully excised within this deep plane to maintain its blood supply.  The edges of the donor site were undermined.   The donor site was closed in a primary fashion.  The pedicle was then rotated into position and sutured.  Once the tube was sutured into place, adequate blood supply was confirmed with blanching and refill.  The pedicle was then wrapped with xeroform gauze and dressed appropriately with a telfa and gauze bandage to ensure continued blood supply and protect the attached pedicle.

## 2023-08-31 NOTE — ED STATDOCS - CONSTITUTIONAL [+], MLM
PC:shortness of breath     Interval history:  Events from yesterday and overnight noted.this morning tolerated PS trial and was extubated subsequently yhis afternoon      Past Medical History:   Diagnosis Date   • Acute on chronic diastolic (congestive) heart failure (CMD) 11/21/2021   • Acute on chronic respiratory failure with hypoxia (CMD) 11/21/2021   • Acute on chronic respiratory failure with hypoxia and hypercapnia (CMD) 01/28/2022   • Anemia    • ANEMIA IRON DEF, CHR BLOOD LOSS    • Anxiety    • Arthritis     rheumatoid arthritis   • Bacteremia due to Klebsiella pneumoniae 11/21/2021   • Benign neoplasm of adrenal gland     right   • Bilateral edema of lower extremity    • Chronic anticoagulation 06/2018    Takes clopidogrel - to stop pre-op on 6/24/18   • Chronic diarrhea    • Chronic pain    • Clostridium difficile infection 04/07/2017   • Congestive Heart Failure    • COPD (chronic obstructive pulmonary disease) (CMD)    • Coronary Artery Disease     MI 3/2010   • Depression    • Fibromyalgia    • Fracture, tibia 11/10/2017    nondisplaced right distal tibia. Due to fall   • Gastritis 08/2023    mild chronic gastritis   • Gastroesophageal reflux disease    • History of home oxygen therapy     2  L at night   • Hyperlipidemia    • Hypertension    • Lactic acidosis 01/28/2022   • Lumbar radiculopathy    • Mobility impaired     uses cane or wlaker with 4 wheels   • Myocardial infarction (CMD) 2009   • NSTEMI, initial episode of care (CMD)    • Osteoporosis    • Other intervertebral disc degeneration, lumbar region     Spondylosis w/o myelopathy or radiculopathy, lumbar region   • Panniculitis affecting regions of neck and back, lumbar region    • PNA (pneumonia) 08/15/2014   • Pneumonia    • Primary osteoarthritis of knee    • Requires assistance with activities of daily living (ADL) 06/2018   • Rheumatoid arthritis(714.0)    • S/P ORIF (open reduction internal fixation) sternal fracture 06/29/2018   •  Sepsis due to Klebsiella pneumoniae (CMD) 11/21/2021   • Sinusitis, chronic    • Sleep apnea     cpap   • Spondylosis without myelopathy or radiculopathy, lumbar region    • Urinary incontinence    • Wears dentures    • Wears glasses        Past Surgical History:   Procedure Laterality Date   • Ankle scope,plantar fasciotomy Right    • Bronchoscopy  01/2019   • Colonoscopy  04/28/2015   • Colonoscopy diagnostic  01/01/2007    no significant findings   • Colonoscopy diagnostic  01/01/2015   • Colonoscopy diagnostic  10/03/2017    10yr recall    • Coronary angioplasty with stent placement  03/01/2010   • Correct bunion  2008    bilateral   • Esophagogastroduodenoscopy  10/03/2017    gastritis   • Esophagogastroduodenoscopy transoral flex diag  07/2023   • Fracture surgery  06/26/2018    ORIF sternal fx   • Hardware removal  01/2019    from sternum, Jan 2019   • Hysterectomy  1980   • Laparoscopy,tubal cautery      Tubal Ligation   • Paravertebral facet inj jnt lumbar sacral 2 Bilateral 08/01/2019    Diagnostic lumbar medial branch blocks   • Ptca     • Ptca with stent  07/13/2022   • Repair of sternum separation  06/29/2018    ORIF (open reduction internal fixation) sternal fracture with Dr. Cecil Obrien on 6/29/2018 at [facility].   • Right and left heart cath/possible ptca  06/03/2022   • Thoracoscopy surg wedg resec lung  08/14/2014    RVATS, extensive SKIP, wedge resection of upper and lower lobe   • Vaginal delivery      x3       Family History   Problem Relation Age of Onset   • Diabetes Mother    • Heart disease Mother    • Alcohol Abuse Father         alcoholism   • Rheumatoid Arthritis Father    • Diabetes Sister    • Heart disease Sister    • Allergic Rhinitis Sister    • Rheumatoid Arthritis Sister    • Hypertension Sister    • Diabetes Brother    • Hypertension Brother    • Hypertension Brother    • Diabetes Brother    • Kidney disease Brother    • Hypertension Brother    • Cirrhosis  Brother        Social History     Socioeconomic History   • Marital status:      Spouse name: Not on file   • Number of children: Not on file   • Years of education: Not on file   • Highest education level: Not on file   Occupational History   • Occupation: disability   Tobacco Use   • Smoking status: Every Day     Current packs/day: 0.25     Average packs/day: 0.3 packs/day for 52.3 years (13.1 ttl pk-yrs)     Types: Cigarettes     Start date: 5/17/1971   • Smokeless tobacco: Never   • Tobacco comments:     Now 2 cigs/day   Vaping Use   • Vaping Use: never used   Substance and Sexual Activity   • Alcohol use: Not Currently   • Drug use: No   • Sexual activity: Not Currently   Other Topics Concern   • Not on file   Social History Narrative    Aug 2019: lives with her sister, has had disability for 15-16 years from her RA. Has 3 children.     Social Determinants of Health     Financial Resource Strain: Low Risk  (8/20/2023)    Financial Resource Strain    • Social Determinants: Financial Resource Strain: None   Food Insecurity: Food Insecurity Present (8/20/2023)    Food Insecurity    • Social Determinants: Food Insecurity: Sometimes   Transportation Needs: No Transportation Needs (8/20/2023)    PRAPARE - Transportation    • Lack of Transportation (Medical): No    • Lack of Transportation (Non-Medical): No   Physical Activity: Unknown (2/10/2022)    Exercise Vital Sign    • Days of Exercise per Week: 0 days    • Minutes of Exercise per Session: Not on file   Stress: Low Risk  (2/10/2022)    Stress    • Social Determinants: Stress: A little bit   Social Connections: Socially Integrated (8/20/2023)    Social Connections    • Social Determinants: Social Connections: 5 or more times a week   Intimate Partner Violence: Not At Risk (8/20/2023)    Intimate Partner Violence    • Social Determinants: Intimate Partner Violence Past Fear: No    • Social Determinants: Intimate Partner Violence Current Fear: No       Eye  Problem(s):negative  ENT Problem(s):negative  Cardiovascular problem(s):as above  Respiratory problem(s):as above  Gastro-intestinal problem(s):negative GI  Genito-urinary problem(s):negative  Musculoskeletal problem(s):negative  Integumentary problem(s):negative  Neurological problem(s):negative  Psychiatric problem(s):negative  Endocrine problem(s):negative  Hematologic and/or Lymphatic problem(s):negative    Current Facility-Administered Medications   Medication   • pantoprazole (PROTONIX INJECT) injection 40 mg   • sodium chloride 0.9 % injector flush 50 mL   • dexAMETHasone (DECADRON) injection 4 mg   • azithromycin (ZITHROMAX) tablet 500 mg   • sodium chloride 0.9% infusion   • sodium chloride 0.9% infusion   • heparin (porcine) injection 5,000 Units   • propofol (DIPRIVAN) infusion   • chlorhexidine gluconate (PERIDEX) 0.12 % solution 15 mL    And   • chlorhexidine gluconate (PERIDEX) 0.12 % solution 15 mL   • fentaNYL (SUBLIMAZE) injection 25 mcg   • sodium chloride 0.9% infusion   • sodium chloride 0.9% infusion   • ipratropium-albuterol (DUONEB) 0.5-2.5 (3) MG/3ML nebulizer solution 3 mL   • NORepinephrine (LEVOPHED) 8 mg/250 mL in dextrose 5 % infusion   • dextrose 50 % injection 25 g   • dextrose 50 % injection 12.5 g   • glucagon (GLUCAGEN) injection 1 mg   • dextrose (GLUTOSE) 40 % gel 15 g   • dextrose (GLUTOSE) 40 % gel 30 g   • insulin lispro (ADMELOG,HumaLOG) - Correction Dose   • fentaNYL (SUBLIMAZE) 2,500 mcg/250 mL in sodium chloride 0.9 % infusion   • [Held by provider] guaiFENesin (MUCINEX) ER tablet 1,200 mg   • diclofenac (VOLTAREN) 1 % gel 4 g   • lidocaine (LIDOCARE) 4 % patch 2 patch   • HYDROcodone-acetaminophen (NORCO) 5-325 MG per tablet 2 tablet   • hydrALAZINE (APRESOLINE) injection 5 mg   • acetaminophen (TYLENOL) tablet 650 mg    Or   • acetaminophen (TYLENOL) suppository 650 mg   • sodium chloride (PF) 0.9 % injection 10 mL   • lidocaine (ANECREAM/LMX) 4 % cream 1 application.   •  lidocaine 1 % injection 5-10 mg   • metoPROLOL tartrate (LOPRESSOR) tablet 25 mg   • dextrose (GLUTOSE) 40 % gel 15 g   • dextrose 50 % injection 25 g   • MIDazolam (VERSED) injection 2 mg   • sodium chloride 0.9 % flush bag 25 mL   • sodium chloride (PF) 0.9 % injection 2 mL   • lactated ringers infusion   • sodium chloride 0.9% infusion   • dextrose 5 % infusion   • cefTRIAXone (ROCEPHIN) syringe 2,000 mg   • DAPTOmycin (CUBICIN) injection 540 mg   • guaiFENesin 100 MG/5ML solution 200 mg   • benzonatate (TESSALON PERLES) capsule 100 mg   • [Held by provider] acetaminophen (TYLENOL) tablet 1,000 mg   • [Held by provider] ambrisentan (LETAIRIS) tablet 10 mg   • sodium chloride 0.9 % flush bag 25 mL   • sodium chloride (PF) 0.9 % injection 2 mL   • [Held by provider] aspirin (ECOTRIN) enteric coated tablet 81 mg   • atorvastatin (LIPITOR) tablet 40 mg   • [Held by provider] clopidogrel (PLAVIX) tablet 75 mg   • fluticasone (FLONASE) 50 MCG/ACT nasal spray 2 spray   • fluticasone-vilanterol (BREO ELLIPTA) 200-25 MCG/ACT inhaler 1 puff   • folic acid (FOLATE) tablet 1 mg   • leflunomide (ARAVA) tablet 10 mg   • melatonin tablet 9 mg   • [Held by provider] metoPROLOL succinate (TOPROL-XL) ER tablet 25 mg   • [Held by provider] mirtazapine (REMERON) tablet 15 mg   • [Held by provider] nicotine (NICODERM) 21 MG/24HR patch 1 patch   • [Held by provider] pramipexole (MIRAPEX) tablet 0.5 mg   • tiZANidine (ZANAFLEX) tablet 2 mg   • [Held by provider] venlafaxine XR (EFFEXOR XR) 24 hr capsule 37.5 mg   • sodium chloride (NORMAL SALINE) 0.9 % bolus 500 mL   • sodium chloride 0.9 % flush bag 25 mL   • Potassium Standard Replacement Protocol (Levels 3.5 and lower)   • Potassium Replacement (Levels 3.6 - 4)   • Magnesium Standard Replacement Protocol   • polyethylene glycol (MIRALAX) packet 17 g   • ondansetron (ZOFRAN) injection 4 mg   • sulfaSALAzine (AZULFIDINE) tablet 500 mg   • sulfaSALAzine (AZULFIDINE) tablet 1,000 mg   •  [Held by provider] gabapentin (NEURONTIN) capsule 400 mg   • [Held by provider] Treprostinil Powder 16 mcg       O/E:  Visit Vitals  /63 (BP Location: RUE - Right upper extremity, Patient Position: Supine)   Pulse 77   Temp 98.1 °F (36.7 °C) (Axillary)   Resp 18   Ht 5' 2\" (1.575 m)   Wt 64.5 kg (142 lb 3.2 oz)   SpO2 100%   BMI 26.01 kg/m²       Examination of the patient reveals:     GENERAL: alert, is in no apparent distress and is well developed and well nourished  LYMPH NODES: no cervical adenopathy, no supraclavicular adenopathy, no axillary adenopathy and no inguinal adenopathy  SKIN normal color, normal texture, normal turgor, no skin rashes, no atypical appearing skin lesions and no bruises  HEAD: normocephalic  EYES: pupils are equal and reactive to light and accomodation extraocular movements are full sclera and conjunctiva are normal lids and lashes are normal  EARS: pinna and external ear is normal bilaterally, external auditory canals are normal and auditory acuity is grossly normal  NOSE: external nose is normal to inspection and no septal deviation  MOUTH/THROAT: tongue is midline and appears normal, oropharynx appears normal, soft palate and uvula are normal and oral mucosa is normal  NECK: neck is supple, no thyromegaly, no anterior cervical adenopathy, no posterior cervical adenopathy and no supraclavicular adenopathy  CHEST: contour is normal with normal AP diameter, normal respiratory excursion and respiratory effort is not labored  LUNGS: expiratory wheezing, decreased air entry bilaterally.   HEART: normal PMI, normal rate and rhythm, no palpable heaves or thrills, S1 and S2 normal, no S3 or S4, no murmurs and no extra heart sounds  ABDOMEN: abdomen is soft, normal active bowel sounds, nontender, without masses, without hepatomegaly, without splenomegaly and no pulsatile masses  BACK: inspection shows no curvature, no discomfort to palpation of the midline and no costovertebral angle  discomfort to palpation  NEUROLOGIC: cranial nerves 2 through 12 normal, motor strength normal, gait and station normal, coordination normal, DTR's normal and symmetric and no tremor noted  EXTREMITIES: no clubbing, no cyanosis and no edema    WBC (K/mcL)   Date Value   08/31/2023 9.8     RBC (mil/mcL)   Date Value   08/31/2023 2.59 (L)     HCT (%)   Date Value   08/31/2023 24.8 (L)     HGB (g/dL)   Date Value   08/31/2023 7.2 (L)     PLT (K/mcL)   Date Value   08/31/2023 264       Sodium (mmol/L)   Date Value   08/31/2023 138     Potassium (mmol/L)   Date Value   08/31/2023 4.7     Chloride (mmol/L)   Date Value   08/31/2023 102     Glucose (mg/dL)   Date Value   08/31/2023 81     Calcium (mg/dL)   Date Value   08/31/2023 8.2 (L)     Carbon Dioxide (mmol/L)   Date Value   08/31/2023 31     BUN (mg/dL)   Date Value   08/31/2023 12     Creatinine (mg/dL)   Date Value   08/31/2023 0.70     XR CHEST AP OR PA    Result Date: 8/21/2023  Narrative: EXAM: XR CHEST AP OR PA INDICATION: reeval for pna, Chronic obstructive pulmonary disease with (acute) exacerbation (CMD), Acute respiratory failure with hypoxia (CMD). COMPARISON: 8/20/2023. FINDINGS:  The heart is normal in size. The pulmonary vasculature is within normal limits. There is diffuse prominence of the interstitium, perhaps slightly increased from the 8/20/2023 radiograph. Unchanged surgical material in the right midlung. There is no new consolidation. No pleural effusion or pneumothorax. Old right posterior lateral rib fracture deformities.     Impression: IMPRESSION: There is evidence of underlying emphysema with perhaps mildly increased interstitial thickening. This could reflect edema or atypical infection superimposed on top of emphysema. There is no dense consolidation. Electronically Signed by: Anisha Lerma MD Signed on: 8/21/2023 9:15 AM Workstation ID: [id number]    XR CHEST PA OR AP 1 VIEW    Result Date: 8/20/2023  Narrative: AP PORTABLE VIEW CHEST  CLINICAL HISTORY:  dyspnea. COMPARISONS: 7/23/2023.     Impression: IMPRESSION: Mild vascular congestion with mild diffuse interstitial prominence. No focal consolidation. No effusion. Borderline heart size is stable. Mild aortic tortuosity. No pneumothorax. Old right rib fractures. Degenerative changes of both shoulders.  Electronically Signed by: Jon Gilmore MD Signed on: 8/20/2023 9:17 AM Workstation ID: [id number]    MRI LIVER W WO CONTRAST    Result Date: 7/25/2023  Narrative: PROCEDURE: MRI LIVER W WO CONTRAST HISTORY: 68-year-old female with abdominal and back pain, questioned acute pancreatitis. TECHNIQUE:  MR images of the abdomen with axial and coronal T2 HASTE, axial diffusion and ADC, axial in and opposed phase T1, axial T2 fat suppressed, axial and coronal pre-contrast T1, and axial and coronal post-contrast T1 weighted images were obtained  per the \"Liver\" protocol. Image quality is mildly degraded by motion artifact. CONTRAST: 6 mL intravenous Gadavist. COMPARISON: CTA chest abdomen pelvis 7/23/2023. FINDINGS: Liver: The liver is normal in size and measures 13.8 cm in the craniocaudal dimension. The hepatic parenchyma demonstrates a smooth contour. There is no loss of signal on opposed phase sequences to suggest hepatic steatosis. An 11 mm segment 6 lesion (series 32 image 40) is faintly T1 hypointense/T2 hyperintense and demonstrates arterial hyperenhancement with persistent enhancement on portal venous and delayed sequences. There is no associated restricted diffusion. The hepatic parenchyma in the region of the other reported hypervascular lesion exhibits normal signal. Biliary system: Negative. Pancreas: Negative. Spleen: Negative. Adrenal glands: A right adrenal mildly heterogeneous 3.4 cm lesion with small areas of T2 hyperintensity cephalad and posteriorly demonstrates significant signal loss on opposed phase sequences. The lesion has mildly increased in size from 2.9 cm in 2010. There is  nodular thickening of the left adrenal gland. A 1.3 cm left adrenal lesion demonstrates loss of signal on opposed-phase sequences and is stable in size dating back to at least 2010. Kidneys: A few small foci of cortical T2 hyperintensity bilaterally are too small to definitively characterize, however likely represent small cysts. No perinephric fluid collection or hydronephrosis. The visualized portions of the ureters are normal in caliber. There are no solid or enhancing renal masses identified. Bowel: The distal esophagus and stomach are unremarkable. The visualized loops of small and large bowel are normal in caliber. There is no bowel obstruction. Vascular: The portal and hepatic veins are patent. Mildly tortuous abdominal aorta with focal ectasia of the infrarenal segment to 2.4 cm. Lymph nodes: No lymphadenopathy. Osseous structures: No enhancing osseous lesions are present. L2 vertebral body height loss related to compression deformity and vertebroplasty changes are unchanged from prior CT. Dextroconvex curvature of the thoracolumbar spine. Other: Mild dependent bibasilar opacities likely represent atelectasis. No pleural effusion is present. The visualized portions of the heart are normal in size. Small fat filled umbilical hernia is present. There our small areas of subcutaneous T2 hyperintensity in the lower ventral abdominal wall related to nonspecific edema.     Impression: IMPRESSION: 1. Hepatic segment 6 lesion demonstrates signal characteristics consistent with a benign flash filling hemangioma. 2. The other hypervascular lesion documented on recent CT exam is not identified. The hepatic parenchyma in this region exhibits normal signal. The finding is therefore attributable to a benign perfusion anomaly. 3. Bilateral adrenal lesions consistent with benign adenomas. I, Attending Radiologist Ric Peters MD, have reviewed the images and report and concur with these findings interpreted by Resident  Radiologist, Nhung Hackett MD. Electronically Signed by: Ric Peters MD Signed on: 7/25/2023 10:08 AM Workstation ID: [id number]    CTA CHEST ABDOMEN PELVIS    Result Date: 7/23/2023  Narrative: EXAM: CTA CHEST ABDOMEN PELVIS CLINICAL INDICATION:  lower chest upper abd pain that radiates to the back, double rule out PE as well. COMPARISON:  CT chest 5/23/2022, 1/23/2022 and 10/19/2021. CT abdomen pelvis CT abdomen pelvis 3/26/2010. TECHNIQUE: A noncontrast CT scan of the chest was performed. Subsequently, a CT angiogram of the chest, abdomen and pelvis was performed following the intravenous administration of 100 mL of Omnipaque 350.  3D reconstruction and 2D multiplanar reformations were also sent to PACS with the primary data set. FINDINGS:   VASCULAR: Aorta and supra-aortic branches: Precontrast images demonstrate no intramural high attenuation to suggest intramural hematoma. Within the limits of a nongated study, there is no evidence of thoracic aortic dissection or aneurysm. No abdominal aortic dissection or aneurysm. Bovine branching pattern of the aortic arch. Mild, less than 50% narrowing of the portion of left subclavian artery due to calcific atherosclerosis. Remaining arch vessels are widely patent. There are atherosclerotic calcifications of the aorta. Pulmonary arteries: Although not tailored for evaluation of the pulmonary arteries, no filling defects are seen in the central pulmonary arteries to suggest pulmonary embolism. The main pulmonary artery is normal in caliber, but the left pulmonary artery is dilated measuring up to approximately 2.7 cm. Visceral arteries: Calcific atherosclerosis of the abdominal artery origins without evidence for high-grade stenosis. The celiac axis, superior mesenteric artery, and inferior mesenteric artery are otherwise unremarkable. Renal arteries: The single bilateral renal arteries are patent with calcific atherosclerosis near the origins. Iliac & femoral arteries:  Calcific atherosclerosis of the iliac vessels with varying degrees of stenosis, no evidence for high-grade stenosis or occlusion. CHEST: Lungs and pleural spaces: Nonspecific patchy opacities within the right lower lobe base. There is a 4 mm right upper lobe pulmonary nodule, not definitively identified on prior examination (302:36). Mild centrilobular emphysematous changes within the bilateral upper lobe apices. Mild interstitial thickening within the bilateral lung bases. Mediastinum: Redemonstration of a 1.2 cm precarinal lymph node not substantially changed dating back to 5/23/2022 (303:39). No discrete hilar lymphadenopathy.. The heart is normal in size. No pericardial effusion.  ABDOMEN/PELVIS: Exam is performed during the arterial phase of enhancement, and therefore suboptimal for evaluation of hollow and solid viscera of the abdomen and pelvis. Scattered nonspecific areas of hyperattenuation/enhancement within the liver, largest measuring up to 1.1 cm within the right inferior hepatic lobe (306:57). These are not definitively identified on prior examinations. Redemonstration of 3.1 cm right adrenal nodule which demonstrates minimal interval change dating back 20510, likely compatible with a benign etiology given benign fat density on prior imaging. Mildly thickened appearance of left adrenal gland, similar compared to prior exams. Otherwise the arterial phase imaging of the spleen, pancreas, gallbladder and kidneys is within normal limits. The stomach is unremarkable. Visualized bowel loops are normal in caliber with no evidence of wall thickening.  No ascites. No discrete abdominal mesenteric or retroperitoneal adenopathy. The appendix within normal limits. No pelvic lymphadenopathy or free fluid. The ureters and urinary bladder are unremarkable. BONES: Mild superior endplate height loss at T6, may suggest an age-indeterminate compression deformity of approximately 30% height loss, this is new in comparison  to prior CT 5/23/2022. No additional osseous fractures are identified. Degenerative changes throughout the thoracolumbar spine. Kyphoplasty changes at L2 vertebral body.  There are multilevel degenerative changes of the spine. ---------------------------------- Fleischner Society Guidelines 2017 Solitary or Multiple Solid <6 mm   Low Risk:  No routine follow-up.   High Risk: Optional 12 month CT (suspicious morphology and/or upper lobe location). NOTES  - Does not apply to patients <34 yo, lung cancer screening CT, patients with immunosuppression, or patients with known primary cancer.  - Measurements refer to mean axial diameter, rounded to the nearest millimeter  - Subsolid nodule categories refer to mean diameter of the entire nodule, including both ground-glass and solid components Radiology. 2017 Feb 23:[id number] ----------------------------------     Impression: IMPRESSION: 1.  No evidence for aortic aneurysm or dissection. 2.  No evidence for pulmonary embolism. 3.  Intervally new slight height loss of T6 vertebral body comparison to prior 5/23/2022, may be suggestive of a mild compression deformity. 4.  Nonspecific right lower lobe opacities with mild interstitial thickening. Differential includes congestive changes or potentially a developing infectious/inflammatory process. 5.  Nonspecific areas of hyperattenuation/enhancement within the liver measuring up to 1.1 cm within the right inferior hepatic lobe. Differential includes flash filling hemangiomas or transient hepatic attenuation differences although underlying lesions are not entirely excluded. Recommend further characterization with a MRI liver. 6.  Intervally new 4 mm right upper lobe pulmonary nodule. Recommend follow-up per Fleischner Society guidelines as detailed above. 7.  Additional findings as above. Electronically Signed by: Tien Calhoun MD Signed on: 7/23/2023 6:24 PM Workstation ID: [id number]    XR CHEST AP OR PA    Result Date:  7/23/2023  Narrative: TECHNIQUE: XR CHEST AP OR PA HISTORY: epigastric pain that radiates to the back COMPARISON: Chest radiograph 5/16/2023 and 10/24/2022. CT chest 5/23/2022 FINDINGS: The cardiomediastinal silhouette is within normal limits. Mild pulmonary vascular prominence. Minimal hazy bibasilar opacities. No substantial focal consolidation. No pleural effusion or pneumothorax.     Impression: IMPRESSION:  1.  Mild pulmonary vasculature prominence. 2.  Subtle hazy bibasilar opacities, possibly sequela of congestive type changes, scarring or atelectasis. This is similar comparison to prior examinations. 3.  No substantial focal consolidation. Electronically Signed by: Tien Calhoun MD Signed on: 7/23/2023 3:18 PM Workstation ID: [id number]      Labs and imaging have been reviewed by me personally.      Assessment/Plan:  1. Acute on chronic hypoxic respiratory  2. COPD exacerbation  3. LILA  4. Pulmonary hypertension  5. Acute on chronic sinusitis  6. History of rheumatoid arthritis   7. DEANN  8. Septic vs distributive shock sec to sedation    - on azithro/ceftriaxone, ID following  -on norepi gtt  - CT chest with E 5-6 disc space active changes concerning for osteomyelitis, no acute airspace opacities, plan for IR guided biopsy today  -  Taper  down steroids, down to 4mg decadron   - continue Breo Ellipta, patient was on Advair prior to admission.  Cont Incruse Ellipta in view of exacerbation  - continue scheduled DuoNebs  - guaifenesin , inhaled mucomyst , flutter valve therapy to help expectorate   - will need updated home O2 eval prior to discharge  - continue inhaled Tyvaso during hospital stay.    Bryan Jackson MD                     [facility]

## 2023-09-01 NOTE — ED ADULT TRIAGE NOTE - PAIN: PRESENCE, MLM
Patient calling requesting refill of Repatha.     Last visit: 8/4/22  Next visit: 9/15/23    Refilled per protocol  
denies pain/discomfort

## 2024-08-06 NOTE — PATIENT PROFILE ADULT. - ABILITY TO HEAR (WITH HEARING AID OR HEARING APPLIANCE IF NORMALLY USED):
Adequate: hears normal conversation without difficulty Pt went to urgent care 5 days ago and was given a z pack for bronchitis. She feels worse and is now coughing up green phlegm.

## 2024-08-22 NOTE — ED ADULT NURSE NOTE - TEMPLATE LIST FOR HEAD TO TOE ASSESSMENT
The patient is Watcher - Medium risk of patient condition declining or worsening    Shift Goals  Clinical Goals: Stable neuros, Safety  Patient Goals: Sleep  Family Goals: CINDY    Progress made toward(s) clinical / shift goals:  Patient is A&Ox4. Educated patient on POC. Q4 hour neuros in place. Q4 30mL flushes in place. HOB at 30 degrees. Medicated patient per MAR for sleep. Ambulated patient up to bathroom with hand held assist. Bed is low and locked. Bed alarm on. Call light within reach. Hourly rounding continues.       Problem: Neuro Status  Goal: Neuro status will remain stable or improve  Outcome: Progressing       Patient is not progressing towards the following goals:      Problem: Dysphagia  Goal: Dysphagia will improve  Outcome: Not Progressing     Problem: Risk for Aspiration  Goal: Patient's risk for aspiration will be absent or decrease  Outcome: Not Progressing  Note: Patient remains at risk for aspiration.       Back Pain

## 2024-10-11 NOTE — ED ADULT NURSE NOTE - NSFALLRSKPASTHIST_ED_ALL_ED
PRIMARY Concern: AMS, worsening agitation, delirium weakness.   SAFETY RISK Concerns (fall risk, behaviors, etc.): fall risk, Aspiration precaution.  Aggression Tool Color: green  Isolation/Type: none  Tests/Procedures for NEXT shift: None  Consults? (Pending/following, signed-off?)Gen  Neurology signed off, SW/CC /PT following.  Where is patient from? (Home, TCU, etc.): [place] [facility]  Other Important info for NEXT shift:Hx Dementia, Dysphagia, Legal blindness- Left, CVA,   Anticipated DC date & active delays: [facility] will assess patient at 9am   SUMMARY NOTE:  Orientation/Cognitive:Alert to self .   Observation Goals (Met/ Not Met): not met  Mobility Level/Assist Equipment: A2/GB/W.T/R.  Antibiotics & Plan (IV/po, length of tx left): none  Pain Management: No s/sx noted.   Tele/VS/O2: VSS on RA  ABNL Lab/BG: .   Diet: Puree diet, Thin liquid, No straws.Total assist with feeding. Upright position.  If coughing on thins or wet voice change to mildly thick liquids.  Bowel/Bladder: Incontinent of bladder   Skin Concerns: Scattered bruising, dry skin.   Drains/Devices: PIV SL.   Patient Stated Goal for Today: SANDRA   Pt's wearing bilat hearing aids.    no